# Patient Record
Sex: FEMALE | Race: WHITE
[De-identification: names, ages, dates, MRNs, and addresses within clinical notes are randomized per-mention and may not be internally consistent; named-entity substitution may affect disease eponyms.]

---

## 2017-03-16 ENCOUNTER — HOSPITAL ENCOUNTER (OUTPATIENT)
Dept: HOSPITAL 47 - RADMAMWWP | Age: 62
Discharge: HOME | End: 2017-03-16
Payer: COMMERCIAL

## 2017-03-16 DIAGNOSIS — Z12.31: Primary | ICD-10-CM

## 2017-03-17 NOTE — MM
Reason for exam: screening  (asymptomatic).

Last mammogram was performed 1 year and 1 month ago.



History:

Patient is postmenopausal and is nulliparous.



Physical Findings:

A clinical breast exam by your physician is recommended on an annual basis and 

results should be correlated with mammographic findings.



MG Screening Mammo w CAD

Bilateral CC and MLO view(s) were taken.

Prior study comparison: February 1, 2016, bilateral MG screening mammo w CAD.  

January 30, 2015, bilateral MG screening mammo w CAD.

There are scattered fibroglandular densities.  Asymmetric breast tissue in the 

left breast.

This finding is more defined when compared with previous exams.





ASSESSMENT: Incomplete: need additional imaging evaluation, BI-RAD 0



RECOMMENDATION:

Special view mammogram of the left breast.





If lesion persists on supplemental views, image directed ultrasound is 

recommended.



[facility] will attempt to contact patient to return for supplemental 

views and ultrasound if indicated.

## 2017-03-27 ENCOUNTER — HOSPITAL ENCOUNTER (OUTPATIENT)
Dept: HOSPITAL 47 - RADMAMWWP | Age: 62
Discharge: HOME | End: 2017-03-27
Payer: COMMERCIAL

## 2017-03-27 DIAGNOSIS — R92.8: Primary | ICD-10-CM

## 2017-03-27 NOTE — MM
Reason for exam: additional evaluation requested from abnormal screening.

Last mammogram was performed less than 1 month ago.



History:

Patient is postmenopausal and is nulliparous.



Physical Findings:

Nurse did not find any significant physical abnormalities on exam.



MG Work Up Mamm w CAD LT

CC and MLO view(s) were taken of the left breast.

Prior study comparison: March 16, 2017, bilateral MG screening mammo w CAD.  

February 1, 2016, bilateral MG screening mammo w CAD.

There are scattered fibroglandular densities.  There is no discrete abnormality 

including area of concern.

No significant new findings when compared with previous films.



These results were verbally communicated with the patient and result sheet given 

to the patient on 3/27/17.





ASSESSMENT: Benign, BI-RAD 2



RECOMMENDATION:

Return to routine screening mammogram schedule for both breasts.

## 2017-07-13 ENCOUNTER — HOSPITAL ENCOUNTER (OUTPATIENT)
Dept: HOSPITAL 47 - CATHEP | Age: 62
LOS: 1 days | Discharge: HOME | End: 2017-07-14
Payer: COMMERCIAL

## 2017-07-13 VITALS — BODY MASS INDEX: 39 KG/M2

## 2017-07-13 DIAGNOSIS — Z79.51: ICD-10-CM

## 2017-07-13 DIAGNOSIS — E78.5: ICD-10-CM

## 2017-07-13 DIAGNOSIS — Z79.899: ICD-10-CM

## 2017-07-13 DIAGNOSIS — Z87.891: ICD-10-CM

## 2017-07-13 DIAGNOSIS — I50.9: ICD-10-CM

## 2017-07-13 DIAGNOSIS — I47.1: ICD-10-CM

## 2017-07-13 DIAGNOSIS — Z82.49: ICD-10-CM

## 2017-07-13 DIAGNOSIS — N18.3: ICD-10-CM

## 2017-07-13 DIAGNOSIS — J44.9: ICD-10-CM

## 2017-07-13 DIAGNOSIS — J43.1: ICD-10-CM

## 2017-07-13 DIAGNOSIS — I13.0: Primary | ICD-10-CM

## 2017-07-13 LAB
ANION GAP SERPL CALC-SCNC: 13 MMOL/L
BUN SERPL-SCNC: 62 MG/DL (ref 7–17)
CALCIUM SPEC-MCNC: 9.9 MG/DL (ref 8.4–10.2)
CHLORIDE SERPL-SCNC: 101 MMOL/L (ref 98–107)
CO2 SERPL-SCNC: 24 MMOL/L (ref 22–30)
GLUCOSE SERPL-MCNC: 121 MG/DL (ref 74–99)
NON-AFRICAN AMERICAN GFR(MDRD): 39
POTASSIUM SERPL-SCNC: 4.4 MMOL/L (ref 3.5–5.1)
SODIUM SERPL-SCNC: 138 MMOL/L (ref 137–145)

## 2017-07-13 PROCEDURE — 93613 INTRACARDIAC EPHYS 3D MAPG: CPT

## 2017-07-13 PROCEDURE — 80048 BASIC METABOLIC PNL TOTAL CA: CPT

## 2017-07-13 PROCEDURE — 94760 N-INVAS EAR/PLS OXIMETRY 1: CPT

## 2017-07-13 PROCEDURE — 93653 COMPRE EP EVAL TX SVT: CPT

## 2017-07-13 PROCEDURE — 93623 PRGRMD STIMJ&PACG IV RX NFS: CPT

## 2017-07-13 PROCEDURE — 94640 AIRWAY INHALATION TREATMENT: CPT

## 2017-07-13 RX ADMIN — BUDESONIDE AND FORMOTEROL FUMARATE DIHYDRATE SCH: 80; 4.5 AEROSOL RESPIRATORY (INHALATION) at 20:30

## 2017-07-13 NOTE — MISC
LETTER TO DR. CASANOVA



Re: Lilliana Sifuentes



Dear Narendra, 



I had the pleasure of seeing Lilliana Sifuentes in electrophysiology followup. 
Prior to her diagnostic EP study, I reviewed her labs. You had recently 
increased lisinopril. There was a significant increase in her BUN and creatinine
, and I discontinued spironolactone and lisinopril. Her creatinine has improved
, but her BUN is still elevated. She is on Lasix 40 mg p.o. daily, which I will 
continue for now. 



She underwent a diagnostic EP study which revealed AV bárbara reentry, and she 
underwent successful radiofrequency ablation for this.



She also underwent a right heart catheter in the same procedure, and she has 
moderate pulmonary hypertension with pulmonary artery pressure and right 
ventricular systolic pressures between 50 and 55 mmHg. Her LA and RA pressures 
are between 16 and 18 mmHg. 



At this time, I would hold off on ACE inhibitors and angiotensin receptor 
blockers, but in the future this could be re-attempted but without Lasix if 
possible. 



Thank you for entrusting me with the care of your patient. Warm regards.



Sincerely,







Ki Rangel M.D.
[facility]ADA

## 2017-07-13 NOTE — PCN
This patient is a 62-year-old female with a history of:

1. Shortness of breath on exertion with elevated right-sided pressures on 2-D 
echo/non-invasive imaging.

2. COPD. She has stopped smoking now. 

3. Recurrent supraventricular tachycardia documented, requiring adenosine for 
treatment, symptomatic.



She was brought to the EP lab for right heart cath, which has been treated 
separately. She was noted to have moderate pulmonary hypertension.



Her recent labs about a week back after the dose of lisinopril was increased to 
20 mg daily showed a rise in BUN and creatinine. The creatinine was greater 
than 2.3 and therefore I held lisinopril along with spironolactone. Repeat 
testing today shows that her BUN and creatinine have improved to 62 and 1.38. 
GFR is 47. Glucose is 121. Potassium is normal. She will be following up with 
Dr. Hill regarding her chronic kidney disease, stage III-IV. 



She was brought to the EP lab for a diagnostic EP study and radiofrequency 
ablation of SVT.



The patient was brought to the EP lab in a fasting state. Written informed 
consent was obtained prior to the procedure. The left shoulder area was prepped 
and draped as per protocol. Lidocaine 1% was used for local anesthesia. A 6 
Wolof sheath was placed in the left axillary vein. Via this, a decapolar 
catheter was positioned in the coronary sinus for coronary sinus pacing and 
recording. Venous sheaths were placed in the right femoral vein and via these, 
diagnostic catheters were placed in the right  heart.



The sinus cycle length was 732 ms, W[place] interval 159 ms, QRS 86 ms,  ms.



AH interval 80 ms. HP interval 31 ms. 



Sinus node recovery times at 600, 500 and 400 ms were 1192, 695 and 1051 ms. 
There was no evidence for delta waves, no slow pathway conduction.



AV node Wenckebach block was 20 ms. VA Wenckebach block 330 ms. SVT consistent 
with AV bárbara (      ) was induced by catheter manipulation in the ventricle. 
Tachycardia cycle length was 440 ms. Later this was once again induced with 
ventricular pacing.



The tachycardia cycle length was 440 ms. Onset of the tachycardia was with a 
long AH interval. VA times were less than 60 ms. Entrainment from the RV was 
performed. VAV response was obtained and the post pacing interval minus 
tachycardia cycle length interval was about 200 ms, consistent with AV bárbara 
reentry. 



A 4 mm tip ablation catheter was used. A long sheath was used for stability. 
Two different sheaths were used to maintain stability, since she has enlarged 
right atrium. Other fibrillation was performed outside the coronary sinus os, 
anterior to it. Junctional rhythm was obtained. Other fibrillations had to be 
performed once again after testing, since AV bárbara reentry was once again 
reducible, but thereafter ventricular pacing resulted in VA block at a long 
pacing cycle length of 550 to 600 ms.



Isuprel was used to induce SVT first time, but at the end of the procedure, 
despite pacing from the coronary sinus as well as from the right ventricle with 
extrastimuli as well as burst stimulation, no SVT was induced. All catheters 
were then removed at the end of the procedure and hemostasis was assured. 



IMPRESSION: 

1. Diagnostic EP study revealing AV bárbara reentry mechanism with tachycardia. 

2. Successful 3-D mapping and ablation.



Three-dimensional mapping of the slow pathway was performed. Three-D mapping 
was performed with a 4 mm tip ablation catheter. The His cloud was mapped and 
the coronary sinus was mapped (FAM map). The tricuspid annulus was mapped. Slow 
pathway was mapped and RF ablation was performed of the CS os anterior to it 
with successful result. 



Normal sinus node function. No excessive pathway conduction. Para-Hisian pacing 
revealed a bárbara response. Patient tolerated the procedure well without any 
acute complications.
MTDD

## 2017-07-13 NOTE — P.PCN
Preoperative Diagnosis: 


Patient with a history of lung disease and pulmonary hypertension





Right heart cath was performed for shortness of breath and elevated RVSP on 

noninvasive testing/2-D echo and Doppler





By the right femoral vein, a [place] catheter, balloontipped was introduced 

into the pulmonary artery





Pulmonary artery pressure 52/11/29 mmHg 


pulmonary capillary wedge pressure 18/1/11





RVSP 47/0/19





RA 16/-1/9





Impression


Moderate pulmonary hypertension with mildly increased RA and LA/wedge pressures


Postoperative Diagnosis: 





Procedure(s) Performed: 





Implants: 





Indications for Procedure: 





Operative Findings: 





Description of Procedure:

## 2017-07-14 VITALS
TEMPERATURE: 98.1 F | DIASTOLIC BLOOD PRESSURE: 81 MMHG | HEART RATE: 73 BPM | SYSTOLIC BLOOD PRESSURE: 150 MMHG | RESPIRATION RATE: 16 BRPM

## 2017-07-14 RX ADMIN — BUDESONIDE AND FORMOTEROL FUMARATE DIHYDRATE SCH PUFF: 80; 4.5 AEROSOL RESPIRATORY (INHALATION) at 07:20

## 2017-07-14 NOTE — P.DS
Providers


Attending physician: 


Ki Rangel





Primary care physician: 


Narendra Sergio





[facility] Course: 





Patient is doing well from a cardiac standpoint.  Rhythm is regular IL interval 

is normal she underwent a diagnostic EP study which showed AV bárbara reentry and 

she underwent successful ablation for this.  Tachycardia was rendered 

noninducible.  She also underwent a right heart cath and she has pulmonary 

artery pressures in the range of 50-55 mmHg.  Right atrial pressure 16 mmHg and 

left atrial pressures approximately 18 mmHg.





She was started on lisinopril previously up to 20 mg by mouth daily and there 

was a significant increase in her creatinine.  This was discontinued 1 day 

prior to ablation.  Her creatinine improved but her BNP is still elevated.  I 

have held lisinopril and spironolactone for now but she continues Lasix 40 mg 

by mouth daily.  This morning her blood pressure is mildly elevated and I am 

adding amlodipine 2.5 mg by mouth daily.  She will see Dr. Hill early next 

week for further adjustment of her blood pressure medications if needed as well 

as reassessment of renal function





Groin is healing well no hematoma.  Heart sounds are normal no rub no gallop 

breath sounds are normal no rhonchi no crackles





Impression


SVT status post ablation


Hypertension, essential


COPD, chronic, past history of smoking


Moderate pulmonary hypertension


Dyslipidemia





[place] discharge home today after ablating in the hallways.  I'm discontinuing 

metoprolol, spironolactone and lisinopril.  I'm adding amlodipine 2.5 mg by 

mouth daily and we'll see her in follow-up in about a week and she will keep 

her appointment with a primary care physician


Patient Condition at Discharge: Stable





Plan - Discharge Summary


New Discharge Prescriptions: 


New


   amLODIPine [Norvasc] 2.5 mg PO DAILY #12 tablet





Discontinued


   Spironolactone [Aldactone] 12.5 mg PO DAILY


   Lisinopril [Zestril] 20 mg PO DAILY


   Metoprolol Succinate [Toprol XL] 50 mg PO DAILY





No Action


   Cholecalciferol [Vitamin D3] 2,000 unit PO DAILY


   Tiotropium Bromide [Spiriva] 1 cap INHALATION RT-DAILY


   Fenofibrate [Lofibra] 160 mg PO DAILY


   Albuterol Inhaler [Ventolin Hfa Inhaler] 2 puff INHALATION RT-Q6H PRN


     PRN Reason: Shortness Of Breath


   Mometasone/Formoterol [Dulera 100 Mcg/5 Mcg Inhaler] 2 puff INHALATION RT-BID


   Furosemide [Lasix] 40 mg PO DAILY #40 tablet


   Pravastatin Sodium [Pravachol] 10 mg PO DAILY


Discharge Medication List





Albuterol Inhaler [Ventolin Hfa Inhaler] 2 puff INHALATION RT-Q6H PRN 06/14/16 [

History]


Cholecalciferol [Vitamin D3] 2,000 unit PO DAILY 06/14/16 [History]


Fenofibrate [Lofibra] 160 mg PO DAILY 06/14/16 [History]


Mometasone/Formoterol [Dulera 100 Mcg/5 Mcg Inhaler] 2 puff INHALATION RT-BID 06 /14/16 [History]


Tiotropium Bromide [Spiriva] 1 cap INHALATION RT-DAILY 06/14/16 [History]


Furosemide [Lasix] 40 mg PO DAILY #40 tablet 06/20/16 [Rx]


Pravastatin Sodium [Pravachol] 10 mg PO DAILY 07/10/17 [History]


amLODIPine [Norvasc] 2.5 mg PO DAILY #12 tablet 07/14/17 [Rx]








Follow up Appointment(s)/Referral(s): 


Ki Rangel MD [STAFF PHYSICIAN] - 1 Week


Activity/Diet/Wound Care/Special Instructions: 


Post EP study - Ablation instructions





1.  Keep access sites dry for 2 days.


2.  No heavy lifting or straining for 2 days.


3.  Avoid bending the hips repeatedly for 2 days.


4.  You may go up and down stairs slowly  





Call if the following is noted





1.  Bleeding, increasing swelling or pain at the access sites.


2.  Increasing chest discomfort, especially upon taking a deep breath.


3.  Increasing shortness of breath, at rest or with exertion.


4.  Undue cough / phlegm


5.  Difficulty or pain while swallowing.


6.  Pain or change in color in the extremities.


7.  Fever, chills, rigors.


8.  Increasing headache or neurologic symptoms.


9.  Dizziness, fainting, palpitations





Stop metoprolol


Stop spironolactone


Stop lisinopril





New medication: Amlodipine 2.5 mg by mouth daily





Keep appointment with Dr. Hill


Follow-up with Dr. Ruby in 1 week for a groin check


Discharge Disposition: HOME SELF-CARE

## 2018-02-13 ENCOUNTER — HOSPITAL ENCOUNTER (INPATIENT)
Dept: HOSPITAL 47 - EC | Age: 63
LOS: 9 days | Discharge: NURSING, REHAB OR LONG-TERM CARE | DRG: 853 | End: 2018-02-22
Attending: SURGERY | Admitting: SURGERY
Payer: COMMERCIAL

## 2018-02-13 VITALS — BODY MASS INDEX: 45.9 KG/M2

## 2018-02-13 DIAGNOSIS — D72.829: ICD-10-CM

## 2018-02-13 DIAGNOSIS — E78.1: ICD-10-CM

## 2018-02-13 DIAGNOSIS — M41.9: ICD-10-CM

## 2018-02-13 DIAGNOSIS — Z86.79: ICD-10-CM

## 2018-02-13 DIAGNOSIS — A41.9: Primary | ICD-10-CM

## 2018-02-13 DIAGNOSIS — E87.3: ICD-10-CM

## 2018-02-13 DIAGNOSIS — E66.01: ICD-10-CM

## 2018-02-13 DIAGNOSIS — J98.11: ICD-10-CM

## 2018-02-13 DIAGNOSIS — K76.0: ICD-10-CM

## 2018-02-13 DIAGNOSIS — T38.0X5A: ICD-10-CM

## 2018-02-13 DIAGNOSIS — J96.21: ICD-10-CM

## 2018-02-13 DIAGNOSIS — Z87.891: ICD-10-CM

## 2018-02-13 DIAGNOSIS — K80.12: ICD-10-CM

## 2018-02-13 DIAGNOSIS — Z79.899: ICD-10-CM

## 2018-02-13 DIAGNOSIS — J44.1: ICD-10-CM

## 2018-02-13 DIAGNOSIS — Z79.51: ICD-10-CM

## 2018-02-13 DIAGNOSIS — I11.0: ICD-10-CM

## 2018-02-13 DIAGNOSIS — I50.32: ICD-10-CM

## 2018-02-13 DIAGNOSIS — E78.5: ICD-10-CM

## 2018-02-13 DIAGNOSIS — R18.8: ICD-10-CM

## 2018-02-13 LAB
ALBUMIN SERPL-MCNC: 3.8 G/DL (ref 3.5–5)
ALP SERPL-CCNC: 127 U/L (ref 38–126)
ALT SERPL-CCNC: 155 U/L (ref 9–52)
ANION GAP SERPL CALC-SCNC: 17 MMOL/L
AST SERPL-CCNC: 150 U/L (ref 14–36)
BASOPHILS # BLD AUTO: 0 K/UL (ref 0–0.2)
BASOPHILS NFR BLD AUTO: 0 %
BUN SERPL-SCNC: 26 MG/DL (ref 7–17)
CALCIUM SPEC-MCNC: 9.9 MG/DL (ref 8.4–10.2)
CHLORIDE SERPL-SCNC: 98 MMOL/L (ref 98–107)
CO2 SERPL-SCNC: 26 MMOL/L (ref 22–30)
EOSINOPHIL # BLD AUTO: 0.1 K/UL (ref 0–0.7)
EOSINOPHIL NFR BLD AUTO: 1 %
ERYTHROCYTE [DISTWIDTH] IN BLOOD BY AUTOMATED COUNT: 5.78 M/UL (ref 3.8–5.4)
ERYTHROCYTE [DISTWIDTH] IN BLOOD: 13.8 % (ref 11.5–15.5)
GLUCOSE SERPL-MCNC: 155 MG/DL (ref 74–99)
HCT VFR BLD AUTO: 49.5 % (ref 34–46)
HGB BLD-MCNC: 15.5 GM/DL (ref 11.4–16)
LIPASE SERPL-CCNC: 33 U/L (ref 23–300)
LYMPHOCYTES # SPEC AUTO: 1.2 K/UL (ref 1–4.8)
LYMPHOCYTES NFR SPEC AUTO: 5 %
MCH RBC QN AUTO: 26.8 PG (ref 25–35)
MCHC RBC AUTO-ENTMCNC: 31.3 G/DL (ref 31–37)
MCV RBC AUTO: 85.6 FL (ref 80–100)
MONOCYTES # BLD AUTO: 1.1 K/UL (ref 0–1)
MONOCYTES NFR BLD AUTO: 5 %
NEUTROPHILS # BLD AUTO: 20.2 K/UL (ref 1.3–7.7)
NEUTROPHILS NFR BLD AUTO: 89 %
PLATELET # BLD AUTO: 280 K/UL (ref 150–450)
POTASSIUM SERPL-SCNC: 3.6 MMOL/L (ref 3.5–5.1)
PROT SERPL-MCNC: 6.8 G/DL (ref 6.3–8.2)
SODIUM SERPL-SCNC: 141 MMOL/L (ref 137–145)
WBC # BLD AUTO: 22.8 K/UL (ref 3.8–10.6)

## 2018-02-13 PROCEDURE — 93312 ECHO TRANSESOPHAGEAL: CPT

## 2018-02-13 PROCEDURE — 85025 COMPLETE CBC W/AUTO DIFF WBC: CPT

## 2018-02-13 PROCEDURE — 85027 COMPLETE CBC AUTOMATED: CPT

## 2018-02-13 PROCEDURE — 94660 CPAP INITIATION&MGMT: CPT

## 2018-02-13 PROCEDURE — 80053 COMPREHEN METABOLIC PANEL: CPT

## 2018-02-13 PROCEDURE — 99285 EMERGENCY DEPT VISIT HI MDM: CPT

## 2018-02-13 PROCEDURE — 84132 ASSAY OF SERUM POTASSIUM: CPT

## 2018-02-13 PROCEDURE — 94003 VENT MGMT INPAT SUBQ DAY: CPT

## 2018-02-13 PROCEDURE — 83036 HEMOGLOBIN GLYCOSYLATED A1C: CPT

## 2018-02-13 PROCEDURE — 94002 VENT MGMT INPAT INIT DAY: CPT

## 2018-02-13 PROCEDURE — 93320 DOPPLER ECHO COMPLETE: CPT

## 2018-02-13 PROCEDURE — 36415 COLL VENOUS BLD VENIPUNCTURE: CPT

## 2018-02-13 PROCEDURE — 83690 ASSAY OF LIPASE: CPT

## 2018-02-13 PROCEDURE — 94770: CPT

## 2018-02-13 PROCEDURE — 83735 ASSAY OF MAGNESIUM: CPT

## 2018-02-13 PROCEDURE — 96374 THER/PROPH/DIAG INJ IV PUSH: CPT

## 2018-02-13 PROCEDURE — 88304 TISSUE EXAM BY PATHOLOGIST: CPT

## 2018-02-13 PROCEDURE — 36600 WITHDRAWAL OF ARTERIAL BLOOD: CPT

## 2018-02-13 PROCEDURE — 82805 BLOOD GASES W/O2 SATURATION: CPT

## 2018-02-13 PROCEDURE — 96375 TX/PRO/DX INJ NEW DRUG ADDON: CPT

## 2018-02-13 PROCEDURE — 96376 TX/PRO/DX INJ SAME DRUG ADON: CPT

## 2018-02-13 PROCEDURE — 94640 AIRWAY INHALATION TREATMENT: CPT

## 2018-02-13 PROCEDURE — 71275 CT ANGIOGRAPHY CHEST: CPT

## 2018-02-13 PROCEDURE — 93325 DOPPLER ECHO COLOR FLOW MAPG: CPT

## 2018-02-13 PROCEDURE — 84100 ASSAY OF PHOSPHORUS: CPT

## 2018-02-13 PROCEDURE — 71045 X-RAY EXAM CHEST 1 VIEW: CPT

## 2018-02-13 PROCEDURE — 87502 INFLUENZA DNA AMP PROBE: CPT

## 2018-02-13 PROCEDURE — 94760 N-INVAS EAR/PLS OXIMETRY 1: CPT

## 2018-02-13 PROCEDURE — 80048 BASIC METABOLIC PNL TOTAL CA: CPT

## 2018-02-13 PROCEDURE — 87493 C DIFF AMPLIFIED PROBE: CPT

## 2018-02-13 PROCEDURE — 74177 CT ABD & PELVIS W/CONTRAST: CPT

## 2018-02-13 PROCEDURE — 93306 TTE W/DOPPLER COMPLETE: CPT

## 2018-02-13 RX ADMIN — CEFAZOLIN SCH MLS/HR: 330 INJECTION, POWDER, FOR SOLUTION INTRAMUSCULAR; INTRAVENOUS at 20:26

## 2018-02-13 RX ADMIN — ONDANSETRON STA MG: 2 INJECTION INTRAMUSCULAR; INTRAVENOUS at 16:37

## 2018-02-13 RX ADMIN — MORPHINE SULFATE PRN MG: 4 INJECTION, SOLUTION INTRAMUSCULAR; INTRAVENOUS at 20:30

## 2018-02-13 NOTE — CT
EXAMINATION TYPE: CT abdomen pelvis w con

 

DATE OF EXAM: 2/13/2018

 

COMPARISON: NONE

 

HISTORY: Patient complains of bloating, bilateral upper quad pain, nausea, and vomiting.

 

CT DLP: 1749 mGycm

Automated exposure control for dose reduction was used.

 

TECHNIQUE:  Helical acquisition of images was performed from the lung bases through the pelvis.

 

CONTRAST: 

Performed with Oral Contrast and with IV Contrast, patient injected with 80 mL of Visipaque 320.

 

FINDINGS: 

 

There is some patchy atelectasis at the lung bases and more on the right side. There is no pleural ef
fusion.

 

There is small pericardial effusion. There is fatty infiltration of the liver. Liver shows no focal d
efect. Bile ducts are not dilated. Gallbladder is large and measures 5.8 cm in diameter. There are sm
all multiple calcified gallstones.

 

Spleen appears normal. There is no evidence of a pancreatic mass.

 

There is some fluid around the anterior gallbladder.

 

I see no intestinal wall thickening. There are no dilated loops. There is mild free fluid in the cul-
de-sac. Bladder distends smoothly. There is no sign of a pelvic mass. There is a small amount of flui
d in the right paracolic gutter. There is no sign of free air. There is no sign of a bowel obstructio
n. I see no intestinal wall thickening. I see no bony destructive process.

IMPRESSION: 

FATTY INFILTRATION OF THE LIVER. MILD PATCHY ATELECTASIS AT THE LUNG BASES.

 

DILATED GALLBLADDER WITH PERICHOLECYSTIC FLUID AND MULTIPLE GALLSTONES. THIS IS CONSISTENT WITH ACUTE
 AND CHRONIC CHOLECYSTITIS.

 

MILD ASCITES.

## 2018-02-13 NOTE — ED
Abdominal Pain HPI





- General


Chief Complaint: Abdominal Pain


Stated Complaint: abdominal pain


Time Seen by Provider: 02/13/18 15:35


Source: patient


Mode of arrival: wheelchair


Limitations: no limitations





- History of Present Illness


Initial Comments: 


Patient presents with a chief complaint of abdominal pain.  She was seen by her 

primary care physician earlier today who was concerned and sent her to the 

emergency department.  Patient states this is being ordered on for about a week 

however gradually worse.  Patient identify any inciting incidences.  There are 

no aggravating or alleviating factors.  Timing is constant.  Patient denies any 

previous history of surgeries to the abdomen.  Patient states that she has been 

nauseated and had a couple episodes of emesis.  Her last bowel movement was 

today.








- Related Data


 Home Medications











 Medication  Instructions  Recorded  Confirmed


 


Albuterol Inhaler [Ventolin Hfa 2 puff INHALATION RT-Q6H PRN 06/14/16 02/13/18





Inhaler]   


 


Cholecalciferol [Vitamin D3] 2,000 unit PO DAILY 06/14/16 02/13/18


 


Fenofibrate [Lofibra] 160 mg PO DAILY 06/14/16 02/13/18


 


Mometasone/Formoterol [Dulera 100 2 puff INHALATION RT-BID 06/14/16 02/13/18





Mcg/5 Mcg Inhaler]   


 


Tiotropium Bromide [Spiriva] 1 cap INHALATION RT-DAILY 06/14/16 02/13/18


 


Pravastatin Sodium [Pravachol] 10 mg PO DAILY 07/10/17 02/13/18


 


Albuterol Nebulized [Ventolin 2.5 mg INHALATION RT-Q6H PRN 02/13/18 02/13/18





Nebulized]   


 


amLODIPine [Norvasc] 10 mg PO DAILY 02/13/18 02/13/18








 Previous Rx's











 Medication  Instructions  Recorded


 


Furosemide [Lasix] 40 mg PO DAILY #40 tablet 06/20/16











 Allergies











Allergy/AdvReac Type Severity Reaction Status Date / Time


 


No Known Allergies Allergy   Verified 02/13/18 15:46














Review of Systems


ROS Statement: 


Those systems with pertinent positive or pertinent negative responses have been 

documented in the HPI.





ROS Other: All systems not noted in ROS Statement are negative.


Gastrointestinal: Reports: abdominal pain, nausea, vomiting, diarrhea





Past Medical History


Past Medical History: COPD, Hyperlipidemia, Hypertension


Additional Past Medical History / Comment(s): S-shaped scoliosis of the spine,


History of Any Multi-Drug Resistant Organisms: None Reported


Past Surgical History: No Surgical Hx Reported


Additional Past Surgical History / Comment(s): TOOTH EXTRACTIONS.  COLONOSCOPY


Past Anesthesia/Blood Transfusion Reactions: No Reported Reaction


Past Psychological History: No Psychological Hx Reported


Smoking Status: Former smoker


Past Alcohol Use History: None Reported


Past Drug Use History: None Reported





- Past Family History


  ** Father


Family Medical History: Congestive Heart Failure (CHF), COPD





  ** Mother


Family Medical History: Congestive Heart Failure (CHF), COPD





General Exam


Limitations: no limitations


General appearance: alert, in no apparent distress


Head exam: Present: atraumatic, normocephalic


Eye exam: Present: normal appearance


ENT exam: Present: mucous membranes moist


Respiratory exam: Present: normal lung sounds bilaterally.  Absent: respiratory 

distress


Cardiovascular Exam: Present: regular rate, normal rhythm


GI/Abdominal exam: Present: soft, distended, tenderness


Neurological exam: Present: alert, oriented X3


Psychiatric exam: Present: normal affect, normal mood


Skin exam: Present: warm, dry, intact





Course





 Vital Signs











  02/13/18





  14:43


 


Temperature 100.2 F H


 


Pulse Rate 111 H


 


Respiratory 20





Rate 


 


Blood Pressure 153/75


 


O2 Sat by Pulse 93 L





Oximetry 














Medical Decision Making





- Medical Decision Making


Patient presents with a chief complaint of abdominal pain.  She was sent in by 

her primary care for concerns of distention.  On initial evaluation, patient is 

mildly febrile at 100.2, and mildly tachycardic.  Patient was given 2 L of IV 

fluid, morphine for pain, and Zofran for nausea.  He'll be evaluated with basic 

abdominal labs, and CT evaluation.





7:49 PM


Lab evaluation of this patient shows leukocytosis at just over 22,000.  Liver 

functions are elevated along with an elevated alk phos.  Bilirubins are normal.

  Computed tomography scan of the abdomen and pelvis shows a dilated 

gallbladder with numerous gallstones and pericholecystic fluid.  Given patient'

s presentation, she'll be started on Zosyn for acute cholecystitis.  I spoke 

with Dr. Bobo who excepts admission of this patient.  He agrees with Zosyn

, would like the patient to be clear liquids until midnight and made nothing by 

mouth at midnight.  Repeat CBC and CMP will be ordered for the morning.








- Lab Data


Result diagrams: 


 02/13/18 16:30





 02/13/18 16:30





 Lab Results











  02/13/18 02/13/18 Range/Units





  16:30 16:30 


 


WBC  22.8 H   (3.8-10.6)  k/uL


 


RBC  5.78 H   (3.80-5.40)  m/uL


 


Hgb  15.5   (11.4-16.0)  gm/dL


 


Hct  49.5 H   (34.0-46.0)  %


 


MCV  85.6   (80.0-100.0)  fL


 


MCH  26.8   (25.0-35.0)  pg


 


MCHC  31.3   (31.0-37.0)  g/dL


 


RDW  13.8   (11.5-15.5)  %


 


Plt Count  280   (150-450)  k/uL


 


Neutrophils %  89   %


 


Lymphocytes %  5   %


 


Monocytes %  5   %


 


Eosinophils %  1   %


 


Basophils %  0   %


 


Neutrophils #  20.2 H   (1.3-7.7)  k/uL


 


Lymphocytes #  1.2   (1.0-4.8)  k/uL


 


Monocytes #  1.1 H   (0-1.0)  k/uL


 


Eosinophils #  0.1   (0-0.7)  k/uL


 


Basophils #  0.0   (0-0.2)  k/uL


 


Sodium   141  (137-145)  mmol/L


 


Potassium   3.6  (3.5-5.1)  mmol/L


 


Chloride   98  ()  mmol/L


 


Carbon Dioxide   26  (22-30)  mmol/L


 


Anion Gap   17  mmol/L


 


BUN   26 H  (7-17)  mg/dL


 


Creatinine   1.10 H  (0.52-1.04)  mg/dL


 


Est GFR (MDRD) Af Amer   >60  (>60 ml/min/1.73 sqM)  


 


Est GFR (MDRD) Non-Af   50  (>60 ml/min/1.73 sqM)  


 


Glucose   155 H  (74-99)  mg/dL


 


Calcium   9.9  (8.4-10.2)  mg/dL


 


Total Bilirubin   0.7  (0.2-1.3)  mg/dL


 


AST   150 H  (14-36)  U/L


 


ALT   155 H  (9-52)  U/L


 


Alkaline Phosphatase   127 H  ()  U/L


 


Total Protein   6.8  (6.3-8.2)  g/dL


 


Albumin   3.8  (3.5-5.0)  g/dL


 


Lipase   33  ()  U/L














Disposition


Clinical Impression: 


 Acute cholecystitis, Abdominal pain, Transaminitis





Disposition: ADMITTED AS IP TO THIS hospitals


Condition: Good


Referrals: 


Narendra Hill DO [Primary Care Provider] - 1-2 days


Decision to Admit Reason: Admit from EC





- Out of Hospital Transfer - Req. Specs


Out of Hospital Transfer - Requested Specifics: Other Non-Acute

## 2018-02-14 LAB
ALBUMIN SERPL-MCNC: 3 G/DL (ref 3.5–5)
ALP SERPL-CCNC: 113 U/L (ref 38–126)
ALT SERPL-CCNC: 161 U/L (ref 9–52)
ANION GAP SERPL CALC-SCNC: 9 MMOL/L
AST SERPL-CCNC: 142 U/L (ref 14–36)
BASOPHILS # BLD AUTO: 0 K/UL (ref 0–0.2)
BASOPHILS NFR BLD AUTO: 0 %
BUN SERPL-SCNC: 25 MG/DL (ref 7–17)
CALCIUM SPEC-MCNC: 8.9 MG/DL (ref 8.4–10.2)
CHLORIDE SERPL-SCNC: 99 MMOL/L (ref 98–107)
CO2 BLDA-SCNC: 29 MMOL/L (ref 19–24)
CO2 SERPL-SCNC: 31 MMOL/L (ref 22–30)
EOSINOPHIL # BLD AUTO: 0.1 K/UL (ref 0–0.7)
EOSINOPHIL NFR BLD AUTO: 1 %
ERYTHROCYTE [DISTWIDTH] IN BLOOD BY AUTOMATED COUNT: 4.99 M/UL (ref 3.8–5.4)
ERYTHROCYTE [DISTWIDTH] IN BLOOD: 13.6 % (ref 11.5–15.5)
GLUCOSE BLD-MCNC: 141 MG/DL (ref 75–99)
GLUCOSE SERPL-MCNC: 130 MG/DL (ref 74–99)
HCO3 BLDA-SCNC: 27 MMOL/L (ref 21–25)
HCT VFR BLD AUTO: 43 % (ref 34–46)
HGB BLD-MCNC: 13.2 GM/DL (ref 11.4–16)
LYMPHOCYTES # SPEC AUTO: 1.1 K/UL (ref 1–4.8)
LYMPHOCYTES NFR SPEC AUTO: 7 %
MCH RBC QN AUTO: 26.5 PG (ref 25–35)
MCHC RBC AUTO-ENTMCNC: 30.8 G/DL (ref 31–37)
MCV RBC AUTO: 86.2 FL (ref 80–100)
MONOCYTES # BLD AUTO: 0.8 K/UL (ref 0–1)
MONOCYTES NFR BLD AUTO: 5 %
NEUTROPHILS # BLD AUTO: 14 K/UL (ref 1.3–7.7)
NEUTROPHILS NFR BLD AUTO: 86 %
PCO2 BLDA: 51 MMHG (ref 35–45)
PH BLDA: 7.34 [PH] (ref 7.35–7.45)
PLATELET # BLD AUTO: 232 K/UL (ref 150–450)
PO2 BLDA: 99 MMHG (ref 83–108)
POTASSIUM SERPL-SCNC: 3.5 MMOL/L (ref 3.5–5.1)
PROT SERPL-MCNC: 5.6 G/DL (ref 6.3–8.2)
SODIUM SERPL-SCNC: 139 MMOL/L (ref 137–145)
WBC # BLD AUTO: 16.2 K/UL (ref 3.8–10.6)

## 2018-02-14 PROCEDURE — 0FJ44ZZ INSPECTION OF GALLBLADDER, PERCUTANEOUS ENDOSCOPIC APPROACH: ICD-10-PCS

## 2018-02-14 PROCEDURE — 0D9670Z DRAINAGE OF STOMACH WITH DRAINAGE DEVICE, VIA NATURAL OR ARTIFICIAL OPENING: ICD-10-PCS

## 2018-02-14 PROCEDURE — 0FT40ZZ RESECTION OF GALLBLADDER, OPEN APPROACH: ICD-10-PCS

## 2018-02-14 PROCEDURE — 0BH17EZ INSERTION OF ENDOTRACHEAL AIRWAY INTO TRACHEA, VIA NATURAL OR ARTIFICIAL OPENING: ICD-10-PCS

## 2018-02-14 PROCEDURE — 5A1945Z RESPIRATORY VENTILATION, 24-96 CONSECUTIVE HOURS: ICD-10-PCS

## 2018-02-14 RX ADMIN — ONDANSETRON STA MG: 2 INJECTION INTRAMUSCULAR; INTRAVENOUS at 13:01

## 2018-02-14 RX ADMIN — DOCUSATE SODIUM SCH: 100 CAPSULE, LIQUID FILLED ORAL at 21:39

## 2018-02-14 RX ADMIN — MORPHINE SULFATE PRN MG: 4 INJECTION, SOLUTION INTRAMUSCULAR; INTRAVENOUS at 06:19

## 2018-02-14 RX ADMIN — MEROPENEM SCH MLS/HR: 1 INJECTION, POWDER, FOR SOLUTION INTRAVENOUS at 20:17

## 2018-02-14 RX ADMIN — POTASSIUM CHLORIDE SCH MLS/HR: 14.9 INJECTION, SOLUTION INTRAVENOUS at 20:18

## 2018-02-14 RX ADMIN — MORPHINE SULFATE PRN MG: 4 INJECTION, SOLUTION INTRAMUSCULAR; INTRAVENOUS at 10:07

## 2018-02-14 RX ADMIN — POTASSIUM CHLORIDE SCH MLS: 14.9 INJECTION, SOLUTION INTRAVENOUS at 12:52

## 2018-02-14 RX ADMIN — IPRATROPIUM BROMIDE SCH MG: 0.5 SOLUTION RESPIRATORY (INHALATION) at 07:55

## 2018-02-14 RX ADMIN — BUDESONIDE AND FORMOTEROL FUMARATE DIHYDRATE SCH: 80; 4.5 AEROSOL RESPIRATORY (INHALATION) at 21:35

## 2018-02-14 RX ADMIN — CEFAZOLIN SCH MLS/HR: 330 INJECTION, POWDER, FOR SOLUTION INTRAMUSCULAR; INTRAVENOUS at 03:21

## 2018-02-14 RX ADMIN — IPRATROPIUM BROMIDE SCH MG: 0.5 SOLUTION RESPIRATORY (INHALATION) at 11:50

## 2018-02-14 RX ADMIN — IPRATROPIUM BROMIDE SCH: 0.5 SOLUTION RESPIRATORY (INHALATION) at 16:10

## 2018-02-14 RX ADMIN — BUDESONIDE AND FORMOTEROL FUMARATE DIHYDRATE SCH PUFF: 80; 4.5 AEROSOL RESPIRATORY (INHALATION) at 07:55

## 2018-02-14 RX ADMIN — FUROSEMIDE SCH: 40 TABLET ORAL at 08:54

## 2018-02-14 RX ADMIN — PROPOFOL SCH MLS/HR: 10 INJECTION, EMULSION INTRAVENOUS at 23:26

## 2018-02-14 RX ADMIN — FENOFIBRATE SCH: 160 TABLET ORAL at 08:54

## 2018-02-14 RX ADMIN — HYDROMORPHONE HYDROCHLORIDE PRN MG: 1 INJECTION, SOLUTION INTRAMUSCULAR; INTRAVENOUS; SUBCUTANEOUS at 23:07

## 2018-02-14 RX ADMIN — CHLORHEXIDINE GLUCONATE SCH ML: 1.2 RINSE ORAL at 21:40

## 2018-02-14 RX ADMIN — ISODIUM CHLORIDE PRN MG: 0.03 SOLUTION RESPIRATORY (INHALATION) at 21:17

## 2018-02-14 RX ADMIN — PROPOFOL SCH MLS/HR: 10 INJECTION, EMULSION INTRAVENOUS at 20:50

## 2018-02-14 RX ADMIN — IPRATROPIUM BROMIDE SCH MG: 0.5 SOLUTION RESPIRATORY (INHALATION) at 21:17

## 2018-02-14 RX ADMIN — PRAVASTATIN SODIUM SCH: 20 TABLET ORAL at 08:54

## 2018-02-14 RX ADMIN — Medication SCH: at 20:10

## 2018-02-14 NOTE — CONS
CONSULTATION



DATE OF CONSULTATION:

02/14/2018



REASON FOR CONSULTATION:

Medical management requested by Dr. Trimble.



CONSULTATION:

This is a very pleasant 63-year-old patient of Dr. Hill.  Chronic stable medical

conditions include COPD, hypertension, hyperlipidemia, scoliosis.  For 3 days, patient

started off with increasing abdominal pain, more so in the right upper quadrant.  First

day, did have nausea and vomiting, had couple of loose stools yesterday, no obvious

fever.  The pain continued to increase and presented to the ER.  CT scan did show

gallstones with pericholecystic fluid admitted for gallstones and acute cholecystitis.

The patient is feeling tired, run down, is n.p.o.



REVIEW OF SYSTEMS:

CONSTITUTIONAL: Weak, tired.

HEENT: None.

RESPIRATORY: Baseline some shortness of breath.

CARDIOVASCULAR:  None.

GASTROINTESTINAL: As above.

GENITOURINARY: None.

MUSCULOSKELETAL: None.

DERMATOLOGICAL: None.

HEMATOLOGICAL:  None.

LYMPHATIC: None.

PSYCHIATRY: None.

NEUROLOGICAL: None.



PAST HISTORY:

Past history of COPD, hypertension, hyperlipidemia, scoliosis.



PAST SURGICAL HISTORY:

Cardiac ablation in 2017.



SOCIAL HISTORY:

Lives alone.  Works in the factory.  The patient smoked for close to 40 years, stopped

in 2016.  No alcohol.



FAMILY HISTORY:

Congestive heart failure and COPD.



HOME MEDICATIONS:

1. Ventolin 2.5 q.6 p.r.n.

2. Spiriva 1 capsule daily.

3. Pravachol 10 mg p.o. daily.

4. Dulera 100/5 two puffs b.i.d.

5. Lasix 40 mg p.o. daily.

6. Lofibra 160 mg p.o. daily.

7. Vitamin D3, 2000 units p.o. daily.

8. Norvasc 10 mg p.o. daily.

9. Ventolin HFA 2 puffs q.6 p.r.n.



ALLERGIES:

None.



PHYSICAL EXAMINATION:

On examination, temperature 100.2, pulse 108, respiration 20, blood pressure 153/75,

pulse ox 93% on room.

GENERAL APPEARANCE: Well built, BMI 40.7, lying in bed, tired appearing.

EYES: Pupils equal. Conjunctivae normal.

HEENT:  Oral cavity normal.

NECK:  JVD not raised. Mass not palpable.

RESPIRATORY: Effort increased.

LUNGS:  Diminished breath sounds.

CARDIOVASCULAR: First and second sounds normal. No edema.

ABDOMEN: Distended, right upper quadrant tenderness.  No guarding or rigidity.  Liver

and spleen not palpable.

LYMPHATIC: No lymph node palpable in the neck or axillae.

PSYCHIATRY:  Alert and oriented x3.  Mood and affect normal.

NEUROLOGICAL: Pupils equal.  Cranial nerves grossly intact. Power and sensation grossly

intact.



INVESTIGATIONS:

White count 22.8, hemoglobin 15.5. Potassium 3.6. BUN 26, creatinine 1.10. , ALT

155, bilirubin 0.7.  CT scan of the abdomen shows fatty infiltration of the liver.

Bile ducts are not dilated.  Gallbladder is large and measures 5.8 cm.  There is

pericholecystic fluid.



ASSESSMENT:

1. Acute on chronic cholecystitis with multiple gallstones causing sepsis, present on

    admission.

2. Hepatic steatosis.

3. Morbid obesity, body mass index 40.7.

4. Chronic obstructive pulmonary disease in an ex-smoker.

5. Hyperlipidemia.

6. Hypertension.

7. Scoliosis.



PLAN:

Will increase patient's IV fluids to 125 mL an hour.  The patient is on IV Zosyn.  Home

medications are resumed.  The patient is n.p.o. and going down to the OR.  Care was

discussed with the patient. _____ also breathing treatments.



Thank you Dr. Trimble.





MMANN-MARIEL / IJN: [id number] / Job#: [id number]

## 2018-02-14 NOTE — XR
EXAMINATION TYPE: XR chest 1V confirm line [facility]

 

DATE OF EXAM: 2/14/2018

 

COMPARISON: NONE

 

HISTORY: Check tube placement

 

TECHNIQUE: Single frontal view of the chest is obtained.

 

FINDINGS:  There is a nasogastric tube that has its tip well below the diaphragm. There is endotrache
al tube that appears in good position. There is pulmonary vascular congestion. There is blunting of c
ostophrenic angles. There is infiltrate and atelectasis at the lung bases.

 

IMPRESSION:  Endotracheal tube is in good position. Nasogastric tube appears to be in the gastric ant
rum. There is probably congestive heart failure. There is increasing infiltrate and atelectasis at th
e lung bases compared to exam 5 hours ago.

## 2018-02-14 NOTE — P.OP
Date of Procedure: 02/14/18


Preoperative Diagnosis: 


Acute cholecystitis


Postoperative Diagnosis: 


Acute gangrenous cholecystitis





Cholelithiasis


Procedure(s) Performed: 


Laparoscopic cholecystectomy


Anesthesia: FRANCY


Surgeon: Neil Trimble


Estimated Blood Loss (ml): 100


Pathology: other (Gallbladder, gallstones)


Condition: stable


Disposition: PACU


Description of Procedure: 


The patient's placed on the operating table in the supine position.  She 

received general anesthesia.  The patient is morbidly obese.  Her BMI is 41.  

She had a very protuberant abdomen.  Her abdomen was prepped and draped in 

usual sterile fashion.  The skin incision sites were anesthetized 1% local 

Xylocaine.  Using 11 blade the skin was incised at the umbilicus.  Next using a 

[place] clamp the umbilicus was grasped and a Veress needle was placed into the 

peritoneal cavity.  Position of the Veress needle was confirmed with positive 

drop test.  After adequate insufflation a 5 mm trocar was placed into the 

peritoneal cavity.  The abdomen was insufflated and then the laparoscope was 

placed back the pleural cavity.  The gallbladder was visualized.  There 

appeared to be patchy necrosis of the gallbladder.  At this point a 10 mL 

trocar is placed in the epigastric position and  two 5 mm trochars placed in 

the right lateral and right mid abdomen position.  The patient's placed in the 

right side up reverse Trendelenburg position.  The omentum was peeled off the 

gallbladder and there appeared to be more necrosis of the gallbladder.  The 

gallbladder wall was so thickened that he could not be grasped with the toothed 

grasper.  At this point using Harmonic scissors and opening was made in the 

fundus of the gallbladder and the gallbladder was aspirated.  The grasper was 

attempted to grasp the gallbladder anterior abdominal wall however was very 

friable and the gallbladder wall tore.  Several attempts were made to grasp the 

gallbladder wall however the gallbladder wall was like tissue paper due to the 

necrosis.  The gallbladder was then positioned and the cephalad position by 

pushing on the gallbladder bed.  And then another grasper was placed on the 

neck of the gallbladder.  There was significant inflammation in the hilum area 

the cystic duct could not be visualized.  Due to the patient's morbid obesity 

it was decided that a open cholecystectomy very difficult.  It was decided to 

perform a subtotal cystectomy by dividing the gallbladder through the neck of 

the gallbladder to avoid the duct structures.  At this point using a [place] 

dissector the back wall the gallbladder was dissected off of the liver.  And 

then a 2-0 Ethibond suture was placed behind the gallbladder and then this was 

secured with a tie knot device.  The suture was secured at the neck of the 

gallbladder away from the cystic duct and common bile duct.  Using the Harmonic 

scissors the gallbladder wall was then transected distally to the ligated 

gallbladder neck.  The back wall the gallbladder was necrotic as well.  Using 

traction and the Harmonic scissors the gallbladder was then dissected off of 

the liver bed.  The gallbladder having open and there were stones in the neck 

of the gallbladder and these were removed using the stone forcep.  The specimen 

was placed in a Endo Catch bag and brought out through the 10 mm trocar site.  

This point the abdomen was irrigated.  There is no bile seen.  There is no 

evidence of any biliary duct injury.  Once again there was severe inflammation 

of the jemima hepatis.  At this point a AIDAN drain is placed through the 

epigastric port site and brought out through the 5 mm trocar site.  The AIDAN 

drain is placed in the bed of the gallbladder.  The abdomen was irrigated.  

There is no being seen.  The trochars withdrawn.  The skin was closed 

interrupted 3-0 Monocryl suture.  Dermabond was applied.  Patient top she will 

well and was sent to recovery in stable condition.

## 2018-02-14 NOTE — P.GSHP
History of Present Illness


H&P Date: 02/13/18


Chief Complaint: Right upper quadrant pain





This a 63-year-old female who's had complaints of right upper quadrant pain.  

Patient states she noticed pain on Sunday night.  Patient was seen in emergency 

room found have gallstones.  She is admitted to the hospital for treatment of 

cholecystitis.  Patient awake CAT scan which shows evidence of cholelithiasis 

and pericholecystic fluid.





Past Medical History


Past Medical History: COPD, Hyperlipidemia, Hypertension


Additional Past Medical History / Comment(s): S-shaped scoliosis of the spine,


History of Any Multi-Drug Resistant Organisms: None Reported


Past Surgical History: No Surgical Hx Reported, Cardiac Ablation


Additional Past Surgical History / Comment(s): TOOTH EXTRACTIONS.  COLONOSCOPY 

cardiac ablation july 2017


Past Anesthesia/Blood Transfusion Reactions: No Reported Reaction


Past Psychological History: No Psychological Hx Reported


Additional Psychological History / Comment(s): PT LIVES ALONE, WORKS IN A 

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


Smoking Status: Former smoker


Past Alcohol Use History: None Reported


Additional Past Alcohol Use History / Comment(s): 6/2016-QUIT SMOKING


Past Drug Use History: None Reported





- Past Family History


  ** Father


Family Medical History: Congestive Heart Failure (CHF), COPD





  ** Mother


Family Medical History: Congestive Heart Failure (CHF), COPD





Medications and Allergies


 Home Medications











 Medication  Instructions  Recorded  Confirmed  Type


 


Albuterol Inhaler [Ventolin Hfa 2 puff INHALATION RT-Q6H PRN 06/14/16 02/13/18 

History





Inhaler]    


 


Cholecalciferol [Vitamin D3] 2,000 unit PO DAILY 06/14/16 02/13/18 History


 


Fenofibrate [Lofibra] 160 mg PO DAILY 06/14/16 02/13/18 History


 


Mometasone/Formoterol [Dulera 100 2 puff INHALATION RT-BID 06/14/16 02/13/18 

History





Mcg/5 Mcg Inhaler]    


 


Tiotropium Bromide [Spiriva] 1 cap INHALATION RT-DAILY 06/14/16 02/13/18 History


 


Furosemide [Lasix] 40 mg PO DAILY #40 tablet 06/20/16 02/13/18 Rx


 


Pravastatin Sodium [Pravachol] 10 mg PO DAILY 07/10/17 02/13/18 History


 


Albuterol Nebulized [Ventolin 2.5 mg INHALATION RT-Q6H PRN 02/13/18 02/13/18 

History





Nebulized]    


 


amLODIPine [Norvasc] 10 mg PO DAILY 02/13/18 02/13/18 History











 Allergies











Allergy/AdvReac Type Severity Reaction Status Date / Time


 


No Known Allergies Allergy   Verified 02/13/18 15:46














Surgical - Exam


 Vital Signs











Temp Pulse Resp BP Pulse Ox


 


 100.2 F H  111 H  20   153/75   93 L


 


 02/13/18 14:43  02/13/18 14:43  02/13/18 14:43  02/13/18 14:43  02/13/18 14:43














Patient is morbidly obese.  Her BMI is 41.





- General


well developed, no distress





- Eyes


PERRL





- ENT


normal pinna





- Neck


no masses





- Respiratory


normal expansion





- Cardiovascular


Rhythm: regular





- Abdomen





Right upper quadrant tenderness.  There is no rebound or guarding.


Abdomen: soft





Results





- Labs





 02/14/18 06:13





 02/14/18 06:13


 Abnormal Lab Results - Last 24 Hours (Table)











  02/13/18 02/13/18 02/14/18 Range/Units





  16:30 16:30 06:13 


 


WBC  22.8 H   16.2 H  (3.8-10.6)  k/uL


 


RBC  5.78 H    (3.80-5.40)  m/uL


 


Hct  49.5 H    (34.0-46.0)  %


 


MCHC    30.8 L  (31.0-37.0)  g/dL


 


Neutrophils #  20.2 H   14.0 H  (1.3-7.7)  k/uL


 


Monocytes #  1.1 H    (0-1.0)  k/uL


 


Carbon Dioxide     (22-30)  mmol/L


 


BUN   26 H   (7-17)  mg/dL


 


Creatinine   1.10 H   (0.52-1.04)  mg/dL


 


Glucose   155 H   (74-99)  mg/dL


 


AST   150 H   (14-36)  U/L


 


ALT   155 H   (9-52)  U/L


 


Alkaline Phosphatase   127 H   ()  U/L


 


Total Protein     (6.3-8.2)  g/dL


 


Albumin     (3.5-5.0)  g/dL














  02/14/18 Range/Units





  06:13 


 


WBC   (3.8-10.6)  k/uL


 


RBC   (3.80-5.40)  m/uL


 


Hct   (34.0-46.0)  %


 


MCHC   (31.0-37.0)  g/dL


 


Neutrophils #   (1.3-7.7)  k/uL


 


Monocytes #   (0-1.0)  k/uL


 


Carbon Dioxide  31 H  (22-30)  mmol/L


 


BUN  25 H  (7-17)  mg/dL


 


Creatinine  1.06 H  (0.52-1.04)  mg/dL


 


Glucose  130 H  (74-99)  mg/dL


 


AST  142 H  (14-36)  U/L


 


ALT  161 H  (9-52)  U/L


 


Alkaline Phosphatase   ()  U/L


 


Total Protein  5.6 L  (6.3-8.2)  g/dL


 


Albumin  3.0 L  (3.5-5.0)  g/dL








 Diabetes panel











  02/13/18 02/14/18 Range/Units





  16:30 06:13 


 


Sodium  141  139  (137-145)  mmol/L


 


Potassium  3.6  3.5  (3.5-5.1)  mmol/L


 


Chloride  98  99  ()  mmol/L


 


Carbon Dioxide  26  31 H  (22-30)  mmol/L


 


BUN  26 H  25 H  (7-17)  mg/dL


 


Creatinine  1.10 H  1.06 H  (0.52-1.04)  mg/dL


 


Glucose  155 H  130 H  (74-99)  mg/dL


 


Calcium  9.9  8.9  (8.4-10.2)  mg/dL


 


AST  150 H  142 H  (14-36)  U/L


 


ALT  155 H  161 H  (9-52)  U/L


 


Alkaline Phosphatase  127 H  113  ()  U/L


 


Total Protein  6.8  5.6 L  (6.3-8.2)  g/dL


 


Albumin  3.8  3.0 L  (3.5-5.0)  g/dL








 Calcium panel











  02/13/18 02/14/18 Range/Units





  16:30 06:13 


 


Calcium  9.9  8.9  (8.4-10.2)  mg/dL


 


Albumin  3.8  3.0 L  (3.5-5.0)  g/dL








 Pituitary panel











  02/13/18 02/14/18 Range/Units





  16:30 06:13 


 


Sodium  141  139  (137-145)  mmol/L


 


Potassium  3.6  3.5  (3.5-5.1)  mmol/L


 


Chloride  98  99  ()  mmol/L


 


Carbon Dioxide  26  31 H  (22-30)  mmol/L


 


BUN  26 H  25 H  (7-17)  mg/dL


 


Creatinine  1.10 H  1.06 H  (0.52-1.04)  mg/dL


 


Glucose  155 H  130 H  (74-99)  mg/dL


 


Calcium  9.9  8.9  (8.4-10.2)  mg/dL








 Adrenal panel











  02/13/18 02/14/18 Range/Units





  16:30 06:13 


 


Sodium  141  139  (137-145)  mmol/L


 


Potassium  3.6  3.5  (3.5-5.1)  mmol/L


 


Chloride  98  99  ()  mmol/L


 


Carbon Dioxide  26  31 H  (22-30)  mmol/L


 


BUN  26 H  25 H  (7-17)  mg/dL


 


Creatinine  1.10 H  1.06 H  (0.52-1.04)  mg/dL


 


Glucose  155 H  130 H  (74-99)  mg/dL


 


Calcium  9.9  8.9  (8.4-10.2)  mg/dL


 


Total Bilirubin  0.7  0.5  (0.2-1.3)  mg/dL


 


AST  150 H  142 H  (14-36)  U/L


 


ALT  155 H  161 H  (9-52)  U/L


 


Alkaline Phosphatase  127 H  113  ()  U/L


 


Total Protein  6.8  5.6 L  (6.3-8.2)  g/dL


 


Albumin  3.8  3.0 L  (3.5-5.0)  g/dL














- Imaging


CT scan - abdomen: report reviewed (Cholelithiasis, para cholecystitis fluid)





Assessment and Plan


Assessment: 





Acute cholecystitis.  Patient will undergo laparoscopic cholecystectomy.

## 2018-02-14 NOTE — XR
EXAMINATION TYPE: XR chest 1V confirm line [facility]

 

DATE OF EXAM: 2/14/2018

 

COMPARISON: 8/23/2016

 

HISTORY: Check tube placement

 

TECHNIQUE: Single frontal view of the chest is obtained.

 

FINDINGS:  Endotracheal tube appears in good position. There is some mild infiltrate and atelectasis 
at the lung bases. There is no gross heart failure. I see no pneumothorax.

 

IMPRESSION:  Endotracheal tube is in good position. This is approximate 4.5 cm from the [facility]. There
 are new lower lobe pulmonary infiltrates and atelectasis compared to old exam.

## 2018-02-15 LAB
ALBUMIN SERPL-MCNC: 2.7 G/DL (ref 3.5–5)
ALP SERPL-CCNC: 130 U/L (ref 38–126)
ALT SERPL-CCNC: 162 U/L (ref 9–52)
ANION GAP SERPL CALC-SCNC: 10 MMOL/L
AST SERPL-CCNC: 160 U/L (ref 14–36)
BASOPHILS # BLD AUTO: 0.1 K/UL (ref 0–0.2)
BASOPHILS NFR BLD AUTO: 0 %
BUN SERPL-SCNC: 21 MG/DL (ref 7–17)
CALCIUM SPEC-MCNC: 8.5 MG/DL (ref 8.4–10.2)
CHLORIDE SERPL-SCNC: 99 MMOL/L (ref 98–107)
CO2 BLDA-SCNC: 31 MMOL/L (ref 19–24)
CO2 SERPL-SCNC: 29 MMOL/L (ref 22–30)
EOSINOPHIL # BLD AUTO: 0.2 K/UL (ref 0–0.7)
EOSINOPHIL NFR BLD AUTO: 1 %
ERYTHROCYTE [DISTWIDTH] IN BLOOD BY AUTOMATED COUNT: 4.55 M/UL (ref 3.8–5.4)
ERYTHROCYTE [DISTWIDTH] IN BLOOD: 13.7 % (ref 11.5–15.5)
GLUCOSE BLD-MCNC: 160 MG/DL (ref 75–99)
GLUCOSE BLD-MCNC: 184 MG/DL (ref 75–99)
GLUCOSE SERPL-MCNC: 117 MG/DL (ref 74–99)
HCO3 BLDA-SCNC: 29 MMOL/L (ref 21–25)
HCO3 BLDA-SCNC: 30 MMOL/L (ref 21–25)
HCO3 BLDA-SCNC: 30 MMOL/L (ref 21–25)
HCT VFR BLD AUTO: 39.6 % (ref 34–46)
HGB BLD-MCNC: 12.3 GM/DL (ref 11.4–16)
LYMPHOCYTES # SPEC AUTO: 1.1 K/UL (ref 1–4.8)
LYMPHOCYTES NFR SPEC AUTO: 10 %
MAGNESIUM SPEC-SCNC: 2 MG/DL (ref 1.6–2.3)
MCH RBC QN AUTO: 27 PG (ref 25–35)
MCHC RBC AUTO-ENTMCNC: 31 G/DL (ref 31–37)
MCV RBC AUTO: 87.2 FL (ref 80–100)
MONOCYTES # BLD AUTO: 0.7 K/UL (ref 0–1)
MONOCYTES NFR BLD AUTO: 6 %
NEUTROPHILS # BLD AUTO: 8.7 K/UL (ref 1.3–7.7)
NEUTROPHILS NFR BLD AUTO: 81 %
PCO2 BLDA: 46 MMHG (ref 35–45)
PCO2 BLDA: 49 MMHG (ref 35–45)
PCO2 BLDA: 50 MMHG (ref 35–45)
PH BLDA: 7.38 [PH] (ref 7.35–7.45)
PH BLDA: 7.39 [PH] (ref 7.35–7.45)
PH BLDA: 7.43 [PH] (ref 7.35–7.45)
PLATELET # BLD AUTO: 239 K/UL (ref 150–450)
PO2 BLDA: 67 MMHG (ref 83–108)
PO2 BLDA: 77 MMHG (ref 83–108)
PO2 BLDA: 83 MMHG (ref 83–108)
POTASSIUM SERPL-SCNC: 3.5 MMOL/L (ref 3.5–5.1)
POTASSIUM SERPL-SCNC: 3.9 MMOL/L (ref 3.5–5.1)
PROT SERPL-MCNC: 5.2 G/DL (ref 6.3–8.2)
SODIUM SERPL-SCNC: 138 MMOL/L (ref 137–145)
WBC # BLD AUTO: 10.8 K/UL (ref 3.8–10.6)

## 2018-02-15 RX ADMIN — SODIUM CHLORIDE SCH MLS/HR: 9 INJECTION, SOLUTION INTRAVENOUS at 18:07

## 2018-02-15 RX ADMIN — IPRATROPIUM BROMIDE SCH MG: 0.5 SOLUTION RESPIRATORY (INHALATION) at 16:43

## 2018-02-15 RX ADMIN — PROPOFOL SCH MLS/HR: 10 INJECTION, EMULSION INTRAVENOUS at 13:31

## 2018-02-15 RX ADMIN — INSULIN ASPART SCH UNIT: 100 INJECTION, SOLUTION INTRAVENOUS; SUBCUTANEOUS at 21:29

## 2018-02-15 RX ADMIN — FUROSEMIDE SCH MG: 40 TABLET ORAL at 09:35

## 2018-02-15 RX ADMIN — HYDROMORPHONE HYDROCHLORIDE PRN MG: 1 INJECTION, SOLUTION INTRAMUSCULAR; INTRAVENOUS; SUBCUTANEOUS at 18:32

## 2018-02-15 RX ADMIN — POTASSIUM CHLORIDE SCH MLS/HR: 14.9 INJECTION, SOLUTION INTRAVENOUS at 21:22

## 2018-02-15 RX ADMIN — POTASSIUM CHLORIDE SCH: 14.9 INJECTION, SOLUTION INTRAVENOUS at 11:48

## 2018-02-15 RX ADMIN — CHLORHEXIDINE GLUCONATE SCH ML: 1.2 RINSE ORAL at 09:32

## 2018-02-15 RX ADMIN — BUDESONIDE AND FORMOTEROL FUMARATE DIHYDRATE SCH: 80; 4.5 AEROSOL RESPIRATORY (INHALATION) at 20:06

## 2018-02-15 RX ADMIN — DOCUSATE SODIUM SCH MG: 100 CAPSULE, LIQUID FILLED ORAL at 21:31

## 2018-02-15 RX ADMIN — IPRATROPIUM BROMIDE SCH MG: 0.5 SOLUTION RESPIRATORY (INHALATION) at 11:28

## 2018-02-15 RX ADMIN — POTASSIUM CHLORIDE SCH MLS/HR: 7.46 INJECTION, SOLUTION INTRAVENOUS at 07:44

## 2018-02-15 RX ADMIN — FENOFIBRATE SCH MG: 160 TABLET ORAL at 09:35

## 2018-02-15 RX ADMIN — POTASSIUM CHLORIDE SCH MLS/HR: 7.46 INJECTION, SOLUTION INTRAVENOUS at 05:37

## 2018-02-15 RX ADMIN — CHLORHEXIDINE GLUCONATE SCH ML: 1.2 RINSE ORAL at 21:31

## 2018-02-15 RX ADMIN — POTASSIUM CHLORIDE SCH: 14.9 INJECTION, SOLUTION INTRAVENOUS at 19:06

## 2018-02-15 RX ADMIN — ENOXAPARIN SODIUM SCH MG: 40 INJECTION SUBCUTANEOUS at 09:33

## 2018-02-15 RX ADMIN — PROPOFOL SCH MLS/HR: 10 INJECTION, EMULSION INTRAVENOUS at 01:52

## 2018-02-15 RX ADMIN — PROPOFOL SCH MLS/HR: 10 INJECTION, EMULSION INTRAVENOUS at 21:24

## 2018-02-15 RX ADMIN — POTASSIUM CHLORIDE SCH MLS/HR: 14.9 INJECTION, SOLUTION INTRAVENOUS at 05:37

## 2018-02-15 RX ADMIN — METHYLPREDNISOLONE SODIUM SUCCINATE SCH MG: 40 INJECTION, POWDER, FOR SOLUTION INTRAMUSCULAR; INTRAVENOUS at 18:03

## 2018-02-15 RX ADMIN — SODIUM CHLORIDE SCH MLS/HR: 9 INJECTION, SOLUTION INTRAVENOUS at 19:00

## 2018-02-15 RX ADMIN — MEROPENEM SCH MLS/HR: 1 INJECTION, POWDER, FOR SOLUTION INTRAVENOUS at 16:41

## 2018-02-15 RX ADMIN — METHYLPREDNISOLONE SODIUM SUCCINATE SCH MG: 40 INJECTION, POWDER, FOR SOLUTION INTRAMUSCULAR; INTRAVENOUS at 11:51

## 2018-02-15 RX ADMIN — IPRATROPIUM BROMIDE SCH MG: 0.5 SOLUTION RESPIRATORY (INHALATION) at 20:02

## 2018-02-15 RX ADMIN — Medication SCH: at 11:48

## 2018-02-15 RX ADMIN — HYDROMORPHONE HYDROCHLORIDE PRN MG: 1 INJECTION, SOLUTION INTRAMUSCULAR; INTRAVENOUS; SUBCUTANEOUS at 03:09

## 2018-02-15 RX ADMIN — DOCUSATE SODIUM SCH: 100 CAPSULE, LIQUID FILLED ORAL at 09:32

## 2018-02-15 RX ADMIN — IPRATROPIUM BROMIDE SCH MG: 0.5 SOLUTION RESPIRATORY (INHALATION) at 07:47

## 2018-02-15 RX ADMIN — MEROPENEM SCH MLS/HR: 1 INJECTION, POWDER, FOR SOLUTION INTRAVENOUS at 09:31

## 2018-02-15 RX ADMIN — PRAVASTATIN SODIUM SCH MG: 20 TABLET ORAL at 09:35

## 2018-02-15 RX ADMIN — BUDESONIDE AND FORMOTEROL FUMARATE DIHYDRATE SCH: 80; 4.5 AEROSOL RESPIRATORY (INHALATION) at 07:50

## 2018-02-15 RX ADMIN — PROPOFOL SCH MLS/HR: 10 INJECTION, EMULSION INTRAVENOUS at 05:17

## 2018-02-15 RX ADMIN — MEROPENEM SCH MLS/HR: 1 INJECTION, POWDER, FOR SOLUTION INTRAVENOUS at 01:52

## 2018-02-15 RX ADMIN — PANTOPRAZOLE SODIUM SCH MG: 40 INJECTION, POWDER, FOR SOLUTION INTRAVENOUS at 09:36

## 2018-02-15 NOTE — CT
EXAMINATION TYPE: CT angio chest

 

DATE OF EXAM: 2/15/2018

 

COMPARISON: CT abdomen pelvis 2/13/2018

 

HISTORY: R/O PE, low oxygen levels.

 

CT DLP: 621.9 mGycm

Automated exposure control for dose reduction was used.

 

CONTRAST: 

CTA scan of the thorax is performed with IV Contrast, patient injected with 77 mL of Omnipaque 350, p
ulmonary embolism protocol.  MIP images are created and reviewed.  3D reconstructed images are create
d on an independent workstation and reviewed.

 

FINDINGS: Endotracheal tube is within the tracheal air column in appropriate position, NG tube is pre
sent with the distal tip within the stomach.

 

LUNGS: The lungs are remarkable for airspace disease in the right greater than left lower lobe, there
 are air bronchograms, no sizable effusion, there is no concerning parenchymal mass or nodule identif
ied.   There is no pneumothorax seen.  The tracheobronchial tree is patent.

 

AORTA:  No additional significant abnormality is seen. Pulmonary artery is dilated.

 

MEDIASTINUM: There is satisfactory enhancement of the pulmonary artery and its branches, there is no 
CT evidence for pulmonary embolism. There are artifacts present due to patient's inability to coopera
te with the exam There are no greater than 1 cm hilar or mediastinal lymph nodes.   No pericardial ef
fusion is seen. 

 

 

OTHER:  Scoliotic curvature is present in the visualized spine, there are degenerative disc changes.

 

Postop changes noted in the right upper quadrant, suspect there is a drainage catheter in place, some
 distortion of the bowel is incompletely evaluated, hyperdense material is present likely due to from
 prior contrast administration, CT.

 

IMPRESSION: 

CORRELATE FOR BILATERAL LOWER LOBE PNEUMONIA. THERE ARE ARTIFACTS WHICH OBSCURE SMALL SUBSEGMENTAL BR
ANCHES OF THE LOWER LOBE PULMONARY ARTERIES HOWEVER NO EVIDENT PULMONARY EMBOLISM. CORRELATE FOR PULM
ONARY ARTERY HYPERTENSION.

## 2018-02-15 NOTE — XR
EXAMINATION TYPE: XR chest 1V

 

DATE OF EXAM: 2/15/2018

 

COMPARISON: 2/14/2018

 

HISTORY: Tube placement

 

TECHNIQUE: Single frontal view of the chest is obtained.

 

FINDINGS:  ET and NG tube noted. Findings are stable. Bilateral infiltrate and small effusion. No pne
umothorax or overt failure.

 

IMPRESSION:  Bilateral infiltrate and small effusion stable.

## 2018-02-15 NOTE — P.CON
Consult Note





- .


Consult date: 02/15/18


Assessment/Plan:: 





This is a 63-year-old  female that presented to [facility] emergency center on February 13 with abdominal pain and been going on 

for 1 week and gradually worsening along with nausea, a couple episodes of 

vomiting and abdominal distention.  She went to her primary care physician and 

was sent into the emergency center.  Patient was given morphine, Zofran and 2 L 

of fluid.  She underwent a CAT scan of the abdomen and pelvis with contrast 

that showed a fatty infiltration of the liver.  Acute and chronic cholecystitis

, gallbladder calculi in the gallbladder neck probable impacted stone in the 

gallbladder neck.  Patchy atelectasis in the lung bases more on the right and 

mild ascites.  On February 14, patient went for laparoscopic cholecystectomy 

with Dr. Trimble finding a gangrenous gallbladder.  Following the procedure, 

patient was extubated and then re-intubated and then admitted into intensive 

care unit consult was placed with Dr. Hutton who is following for intensive 

care management.  Patient presented with a temperature 100.2, white count of 

22.8 which is improved to 10.8.  Creatinine is 1.1.  Liver function tests of 

been elevated currently with AST of 160, , alkaline phosphatase 1:30, 

albumin 2.7.  Patient has not required vasopressors.  Her urine output has been 

adequate.  Repeat chest x-ray this morning shows probable heart failure with 

increased infiltrate or atelectasis in the lung bases.  Patient has been 

treated with IV antibiotics the form of Zosyn and Ancef and  was changed to 

meropenem by ID.  Patient remains intubated at this time and appears 

comfortable on mechanical ventilation.  Please see the consult note is dictated 

by nurse practitioner Mrs. Rut Ballesteros.


This 63-year-old woman who suffers superobesity remains intubated and sedated 

with her respiratory failure after her emergent cholecystectomy for gangrenous 

cholecystitis.  Pulmonary critical care is working at attempts for extubation.  

Antimicrobial therapy was altered to meropenem to enhance deep penetration into 

the biliary tract given her sepsis.  Cultures will be monitored.  Leukocytosis 

recommended her sepsis from the gangrenous cholecystitis and expect 

improvement.  Continue supportive care and is receiving nutritional support 

also.  The family was presents questions are answered.  I agree with evaluation

, assessment and plan as dictated by nurse practitioner Mrs. Rut Ballesteros.

## 2018-02-15 NOTE — P.PN
Progress Note - Text


Progress Note Date: 02/15/18


DATE OF SERVICE: 


02/15/2018





PRESENTING COMPLAINT:


Acute abdominal pain





HISTORY OF PRESENT ILLNESS:


63-year-old female who for 3 days had increasing abdominal pain more so on the 

right upper quadrant with associated nausea and vomiting couple of loose stools 

no obvious fever.  Pain continued to increase and presented to the emergency 

department.  Computed tomography scan showed gallstones and pericholecystic 

fluid admitted for gallstones and acute cholecystitis.  Currently is status 

post laparoscopic cholecystectomy





INTERVAL HISTORY:


02/15/2018:


Lying in bed remains intubated.  Spontaneous breathing trial attempted this 

morning with no success, with some concern for right to left shunt and  2-D 

echo completed with a bubble study and it was negative.  As such patient 

remains intubated and Remains on FiO2 of 60% PEEP 5.  Propofol for sedation 

antibiotics in place.





REVIEW OF SYSTEMS:


Unable to assess due to condition 





CURRENT MEDICATIONS


Norco, albuterol, Norvasc, Symbicort, Peridex, cholecalciferol, Colace, Lovenox

, fenofibrate, Lasix, Dilaudid, Toradol, meropenem, Solu-Medrol, Reglan, 

Protonix, Pravachol, propofol.  Tramadol.





PHYSICAL EXAM


VITAL SIGNS: 


Temperature 98.8, pulse 74, respiration 13, blood pressure 119/68, oxygen 

saturation 97% on mechanical ventilation 100% FiO2.





GENERAL APPEARANCE:  Lying in bed, sedated and on the ventilator not in 

distress. 


HENT: Normocephalic, JVD not raised. Mass not palpable.  Oral cavity ET tube 

and OG tube in place, external appearance of ears and nose normal.


EYES:Pupils equal. Conjunctiva normal.


RESPIRATORY: Respiratory effort assisted by ventilator. Lungs diminished to 

auscultation. 


CARDIOVASCULAR: First and second sounds normal. No edema. 


ABDOMEN: Soft.  5 puncture sites clean dry and intact closed with skin glue, 

right upper quadrant with dressing and AIDAN drain with serosanguineous drainage.  

Liver and spleen not palpable. No tenderness. No mass palpable. 


PSYCHIATRY: Unable to assess due to condition





INVESTIGATIONS:


LABS: White blood cell count 10.8, BUN 21, creatinine 0.80.  , ALTs 162, 

alkaline phosphatase 130, total protein 5.2, albumin 2.7.


Chest CTA: Correlate for bilateral lower lobe pneumonia, no evidence of 

pulmonary embolism





ASSESSMENT:


-Acute gangrenous cholecystitis followed by laparoscopic cholecystectomy, now 

AIDAN drain in place causing sepsis present on admission.


-Postoperative hypoxic respiratory failure unexpected most likely related to 

bibasilar atelectasis and underlying COPD concerns for left-to-right shunt, 

requiring mechanical ventilation support.


-Gallstones followed by cholecystectomy.


-Hepatics steatosis.


-Morbid obesity BMI 40.7.


-Chronic obstructive pulmonary disease in ex-smoker.


-Hyperlipidemia.


-Essential hypertension.


-Scoliosis.





PLAN:


Continue mechanical ventilation titrate oxygen down with spontaneous breathing 

trials scheduled daily until patient is successfully extubated.  Continue 

sedation, bronchodilators antibiotics and steroids for underlying COPD.  Plan 

of care discussed the bedside will follow closely.





NP statement: Patient was seen and examined by nurse practitioner Ligia Peralta and all elements of the case discussed with attending  Dr. Peña

## 2018-02-15 NOTE — P.PN
<Carmelina Damico - Last Filed: 02/15/18 14:21>





Subjective


Progress Note Date: 02/15/18





63-year-old female being seen in the intensive care unit is orally intubated on 

mechanical ventilation.  Patients being followed by the intensivist for ICU 

management    patient is postop laparoscopic cholecystectomy for acute 

gangrenous cholecystitis done on February 14.  Postoperatively the patient was 

extubated in the recovery room needed to be reintubated due to acute hypoxic 

respiratory failure.  Patient was transferred to the intensive care unit on 

mechanical ventilation.  Currently on vent support sedated does open eyes to 

verbal stimuli.





Patient did have an echocardiogram done this morning it showed mild LVH.  Left 

ventricular systolic function normal with an EF between 55 and 60% no evidence 

of pulmonary hypertension.  No evidence of a shunt.





Objective





- Vital Signs


Vital signs: 


 Vital Signs











Temp  99 F   02/15/18 12:00


 


Pulse  82   02/15/18 14:00


 


Resp  20   02/15/18 14:00


 


BP  122/72   02/15/18 14:00


 


Pulse Ox  89 L  02/15/18 14:00








 Intake & Output











 02/14/18 02/15/18 02/15/18





 18:59 06:59 18:59


 


Intake Total 2100 1360.563 900


 


Output Total 


 


Balance 1590 670.563 -1295


 


Weight  130 kg 


 


Intake:   


 


  IV 2100 1000 800


 


    Lactated Ringers 1,000 ml  1000 600





    @ 125 mls/hr IV .Q8H CARYN   





    Rx#:[id number]   


 


    Meropenem 1 gm In Sodium   100





    Chloride 0.9% 100 ml @   





    100 mls/hr IVPB Q8HR C[facility]N   





    Rx#:[id number]   


 


    Potassium Chloride 10 meq   100





    In Water For Injection 1   





    100ml.bag @ 100 mls/hr   





    IVPB Q1H C[facility]N Rx#:   





    [id number]   


 


  Intake, IV Titration  360.563 100





  Amount   


 


    Meropenem 1 gm In Sodium  100 





    Chloride 0.9% 100 ml @   





    100 mls/hr IVPB Q8HR CARYN   





    Rx#:[id number]   


 


    Propofol 1,000 mg In  260.563 100





    Empty Bag 1 bag @ Titrate   





    IV .Q0M C[facility]N Rx#:   





    [id number]   


 


Output:   


 


  Drainage  155 150


 


    Right Abdomen  155 150


 


  Urine 


 


  Estimated Blood Loss 100  


 


Other:   


 


  Voiding Method  Indwelling Catheter Indwelling Catheter














- Exam





Physical exam


63-year-old female orally intubated on mechanical vent support sedated


Lungs anterior diminished at the bases FiO2 60% with a sat of 90%


Heart S1-S2 audible regular


Abdomen surgical dressing site dry soft nondistended few hypoactive bowel tones 

AIDAN drain right lower quadrant serous drainage noted indwelling Pulido catheter 

in place


Extremities Venodyne's on to the bilateral lower extremities





- Labs


CBC & Chem 7: 


 02/15/18 04:27





 02/15/18 04:27


Labs: 


 Abnormal Lab Results - Last 24 Hours (Table)











  02/14/18 02/14/18 02/15/18 Range/Units





  17:27 19:13 04:27 


 


WBC     (3.8-10.6)  k/uL


 


Neutrophils #     (1.3-7.7)  k/uL


 


ABG pH  7.34 L    (7.35-7.45)  


 


ABG pCO2  51 H    (35-45)  mmHg


 


ABG pO2     ()  mmHg


 


ABG HCO3  27 H    (21-25)  mmol/L


 


ABG Total CO2  29 H    (19-24)  mmol/L


 


ABG O2 Saturation  97.5 H    (94-97)  %


 


BUN    21 H  (7-17)  mg/dL


 


Glucose    117 H  (74-99)  mg/dL


 


POC Glucose (mg/dL)   141 H   (75-99)  mg/dL


 


AST    160 H  (14-36)  U/L


 


ALT    162 H  (9-52)  U/L


 


Alkaline Phosphatase    130 H  ()  U/L


 


Total Protein    5.2 L  (6.3-8.2)  g/dL


 


Albumin    2.7 L  (3.5-5.0)  g/dL














  02/15/18 02/15/18 Range/Units





  04:27 04:58 


 


WBC  10.8 H   (3.8-10.6)  k/uL


 


Neutrophils #  8.7 H   (1.3-7.7)  k/uL


 


ABG pH    (7.35-7.45)  


 


ABG pCO2   49 H  (35-45)  mmHg


 


ABG pO2   77 L  ()  mmHg


 


ABG HCO3   30 H  (21-25)  mmol/L


 


ABG Total CO2   31 H  (19-24)  mmol/L


 


ABG O2 Saturation    (94-97)  %


 


BUN    (7-17)  mg/dL


 


Glucose    (74-99)  mg/dL


 


POC Glucose (mg/dL)    (75-99)  mg/dL


 


AST    (14-36)  U/L


 


ALT    (9-52)  U/L


 


Alkaline Phosphatase    ()  U/L


 


Total Protein    (6.3-8.2)  g/dL


 


Albumin    (3.5-5.0)  g/dL














Assessment and Plan


Assessment: 





Impression


Present on admission 3 day duration of right upper quadrant pain suspect due to 

an acute cholecystitis


CAT scan of the abdomen pelvis on admission showed evidence of cholelithiasis


Postop laparoscopic cholecystectomy due to an acute gangrenous cholecystitis 

done on February 14


Acute hypoxic respiratory failure unclear etiology needs to be urgently 

reintubated postoperatively


Morbid obesity BMI 44


Echocardiogram done February 15 no evidence of shunt no pulmonary hypertension 

left ventricular systolic function normal EF between 55 and 60%








Plan


Continue with the recommendations by the intensivist for management of the ICU


Pulmonary management per the intensivist


Continue recommendations by infectious disease


DVT and GI prophylaxis


Further surgical recommendations pending








Progress note dictated for Dr. Rob rounding on behalf of dr gilbert


The above impression and plan of care have been discussed and directed by 

signing physician. Carmelina Damico nurse practitioner acting as scribe for signing 

physician.





<Kelli Rob N - Last Filed: 02/20/18 04:46>





Objective





- Vital Signs


Vital signs: 


 Vital Signs











Temp  97.2 F L  02/19/18 23:00


 


Pulse  54 L  02/19/18 23:00


 


Resp  16   02/19/18 23:00


 


BP  122/70   02/19/18 23:00


 


Pulse Ox  96   02/19/18 23:00








 Intake & Output











 02/19/18 02/19/18 02/20/18





 06:59 18:59 06:59


 


Intake Total 875 1700 75


 


Output Total 845 1615 80


 


Balance 30 85 -5


 


Weight 133 kg 133 kg 


 


Intake:   


 


   1000 75


 


    Lactated Ringers 1,000 ml 675 900 75





    @ 75 mls/hr IV .[id number]   





    CARYN Rx#:[id number]   


 


    Meropenem 1 gm In Sodium 200 100 





    Chloride 0.9% 100 ml @   





    100 mls/hr IVPB Q8HR C[facility]N   





    Rx#:[id number]   


 


  Oral  700 


 


Output:   


 


  Drainage 70 50 80


 


    Right Abdomen 70 50 80


 


  Urine 775 1565 


 


Other:   


 


  Voiding Method Indwelling Catheter Indwelling Catheter Bedside Commode





   Bedpan


 


  # Voids 1  3


 


  # Bowel Movements  2 














- Labs


CBC & Chem 7: 


 02/18/18 04:57





 02/19/18 03:55


Labs: 


 Abnormal Lab Results - Last 24 Hours (Table)











  02/19/18 02/19/18 02/19/18 Range/Units





  03:55 07:10 12:15 


 


Carbon Dioxide  34 H    (22-30)  mmol/L


 


BUN  34 H    (7-17)  mg/dL


 


Glucose  149 H    (74-99)  mg/dL


 


POC Glucose (mg/dL)   138 H  155 H  (75-99)  mg/dL


 


Magnesium  2.4 H    (1.6-2.3)  mg/dL


 


AST  51 H    (14-36)  U/L


 


ALT  93 H    (9-52)  U/L


 


Total Protein  5.8 L    (6.3-8.2)  g/dL


 


Albumin  3.1 L    (3.5-5.0)  g/dL














  02/19/18 02/19/18 Range/Units





  16:54 20:29 


 


Carbon Dioxide    (22-30)  mmol/L


 


BUN    (7-17)  mg/dL


 


Glucose    (74-99)  mg/dL


 


POC Glucose (mg/dL)  186 H  161 H  (75-99)  mg/dL


 


Magnesium    (1.6-2.3)  mg/dL


 


AST    (14-36)  U/L


 


ALT    (9-52)  U/L


 


Total Protein    (6.3-8.2)  g/dL


 


Albumin    (3.5-5.0)  g/dL














Assessment and Plan


(1) Acute cholecystitis


Current Visit: Yes   Status: Acute   Code(s): K81.0 - ACUTE CHOLECYSTITIS   

SNOMED Code(s): 48155639


   





(2) Acute hypoxemic respiratory failure


Current Visit: Yes   Status: Acute   Code(s): J96.01 - ACUTE RESPIRATORY 

FAILURE WITH HYPOXIA   SNOMED Code(s): 614409499


   





(3) Hypoxemic respiratory failure, chronic


Current Visit: Yes   Status: Acute   Code(s): J96.11 - CHRONIC RESPIRATORY 

FAILURE WITH HYPOXIA   SNOMED Code(s): 120426522


   





(4) Morbid obesity with BMI of 45.0-49.9, adult


Current Visit: Yes   Status: Acute   Code(s): E66.01 - MORBID (SEVERE) OBESITY 

DUE TO EXCESS CALORIES; Z68.42 - BODY MASS INDEX (BMI) 45.0-49.9, ADULT   

SNOMED Code(s): 946410978


   





(5) Status post cholecystectomy


Current Visit: Yes   Status: Acute   Code(s): Z90.49 - ACQUIRED ABSENCE OF 

OTHER SPECIFIED PARTS OF DIGESTIVE TRACT   SNOMED Code(s): 196348430


   





(6) Transaminitis


Current Visit: Yes   Status: Acute   Code(s): R74.0 - NONSPEC ELEV OF LEVELS OF 

TRANSAMNS & LACTIC ACID DEHYDRGNSE   SNOMED Code(s): 839501945

## 2018-02-15 NOTE — PN
PROGRESS NOTE



DATE OF SERVICE:

02/15/18.



ATTENDING NOTE:

The patient seen and examined by me.  I discussed with nurse practitioner, Ms. Peralta.

The patient is status post cholecystectomy laparoscopically, currently on the

ventilator.  Attempts of weaning were not successful.  Hence, a 2-D echocardiogram was

ordered with a bubble study, came back to be negative.  The patient has a FiO2 of 60

and a PEEP of 5 and on propofol.



PHYSICAL EXAMINATION:

Temperature 99, pulse 97, respiratory 23, blood pressure 112/73, pulse ox 92% on the

vent.

LUNGS:  Diminished breath sounds.  Cardiovascular: Heart sounds muffled.  Abdomen has

got a AIDAN drain in place.



INVESTIGATIONS:

White count 10.8, hemoglobin 12.3, potassium 3.5, BUN 21, creatinine 0.80, bilirubin

0.2.



ASSESSMENT:

1. Acute gangrenous cholecystitis followed by a laparoscopic cholecystectomy.  Now the

    AIDAN drain in place causing sepsis present on admission.

2. Gallstones followed by cholecystectomy.

3. Hepatic steatosis.

4. Morbid obesity BMI 40.7.

5. Chronic obstructive pulmonary disease in an ex-smoker.

6. Hyperlipidemia.

7. Essential hypertension.

8. Scoliosis.

9. Patient currently on ventilator assistance.



PLAN:

The patient is on IV meropenem, IV Solu-Medrol, IV fluids, and propofol.



MMODL / IJN: [id number] / Job#: [id number]

## 2018-02-15 NOTE — ECHOF
Referral Reason:hypoxemia,possiblr rt to lt shunt



MEASUREMENTS

--------

HEIGHT: 170.2 cm

WEIGHT: 129.7 kg

BP: 96/66

IVSd:   1.4 cm     (0.6 - 1.1)

LVIDd:   4.7 cm     (3.9 - 5.3)

LVPWd:   1.2 cm     (0.6 - 1.1)

IVSs:   1.5 cm

LVIDs:   2.4 cm

LVPWs:   1.4 cm

Ao Diam:   3.5 cm     (2.0 - 3.7)

AV Cusp:   2.1 cm     (1.5 - 2.6)

LA Diam:   4.0 cm     (2.7 - 3.8)

MV E Cornelius:   0.84 m/s

MV DecT:   220 ms

MV A Cornelius:   0.85 m/s

MV E/A Ratio:   0.99 

RAP:   5.00 mmHg

RVSP:   12.04 mmHg







FINDINGS

--------

Sinus rhythm.

This was a technically difficult study with suboptimal views.   No subcostals due to surgery

The left ventricular size is normal.   There is mild concentric left ventricular hypertrophy.   Overa
ll left ventricular systolic function is normal with, an EF between 55 - 60 %.

The RV was not well visualized.

The left atrium was not well visualized.

The right atrium was not well visualized.

Contrast study was performed with 2 iv injections of 5 ccs of agitated normal saline, at rest.

No evidence of shunt.

5ml of Lumason was utilized for enhancement of images.

The aortic valve was not well visualized.

The mitral valve leaflets are mildly thickened.   There is trace to mild mitral regurgitation.

Trace tricuspid regurgitation present.   Right ventricular systolic pressure is normal at < 35 mmHg. 
  There is no evidence of pulmonary hypertension.

The pulmonic valve was not well visualized.

The aortic root size is normal.

IVC Not well visulized.

There is no pericardial effusion.



CONCLUSIONS

--------

1. Sinus rhythm.

2. This was a technically difficult study with suboptimal views.

3. No subcostals due to surgery

4. The left ventricular size is normal.

5. There is mild concentric left ventricular hypertrophy.

6. Overall left ventricular systolic function is normal with, an EF between 55 - 60 %.

7. The RV was not well visualized.

8. The left atrium was not well visualized.

9. The right atrium was not well visualized.

10. Contrast study was performed with 2 iv injections of 5 ccs of agitated normal saline, at rest.

11. No evidence of shunt.

12. 5ml of Lumason was utilized for enhancement of images.

13. The aortic valve was not well visualized.

14. The mitral valve leaflets are mildly thickened.

15. There is trace to mild mitral regurgitation.

16. Trace tricuspid regurgitation present.

17. Right ventricular systolic pressure is normal at < 35 mmHg.

18. The pulmonic valve was not well visualized.

19. There is no pericardial effusion.





SONOGRAPHER: Kvng Cline RDCS

## 2018-02-15 NOTE — P.PN
Progress Note - Text


Progress Note Date: 02/15/18





Patient still intubated with attempted weaning in progress. Continue ICU care.

## 2018-02-15 NOTE — P.CNPUL
History of Present Illness


Consult date: 02/15/18


Requesting physician: Neil Trimble


Reason for consult: other (ICU management, patient is on mechanical ventilation)


Chief complaint: Right upper quadrant pain


History of present illness: 





This is a 63-year-old female, known history of COPD, obesity, hypertension, 

hyperlipidemia, history of cardiac arrhythmia in the form of atrial 

fibrillation requiring ablation, patient presented to the ER yesterday with 3 

days history of right upper quadrant pain.  CT of the abdomen and pelvis showed 

evidence of cholelithiasis and pericholecystic fluid.  Patient was seen by 

surgery on consultation, and she underwent laparoscopic cholecystectomy last 

night.  Post operatively, patient was extubated in the recovery room, however 

she had to be reintubated because of hypoxic respiratory failure.  Patient was 

sent to the ICU on mechanical ventilation, and I saw this morning she was still 

on relatively high FiO2 80%, PEEP of 5, and her ABG was rather marginal.  Her 

pO2 was 77 pCO2 was 49 pH of 7.39.  Initial ABG post intubation showed a pO2 of 

99 pCO2 of 51 and pH of 7.34.  The chest x-ray showed minimal bibasilar 

atelectasis, not severe enough to explain the degree of hypoxemia noted on the 

ABG.  Her O2 saturation on 80% was 94%, and as I went down to extubate percent, 

her O2 saturation remained basically about the same, 92%.  Considering that no 

change with dropping down the FiO2, I felt the patient may have to be evaluated 

for possible intracardiac right-to-left shunt.  Hence I recommended an urgent 

echocardiogram/contrast study to rule out an intracardiac shunt.  The patient 

is presently sedated, and not much history could be obtained from the patient.  

Chest x-ray and labs as well as medications were all reviewed.





Review of Systems





Cannot be obtained, no family members at bedside.


ROS unobtainable: due to endotracheal tube





Past Medical History


Past Medical History: COPD, Hyperlipidemia, Hypertension


Additional Past Medical History / Comment(s): S-shaped scoliosis of the spine,


History of Any Multi-Drug Resistant Organisms: None Reported


Past Surgical History: No Surgical Hx Reported, Cardiac Ablation


Additional Past Surgical History / Comment(s): TOOTH EXTRACTIONS.  COLONOSCOPY 

cardiac ablation july 2017


Past Anesthesia/Blood Transfusion Reactions: No Reported Reaction


Past Psychological History: No Psychological Hx Reported


Additional Psychological History / Comment(s): PT LIVES ALONE, WORKS IN A 

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


Smoking Status: Former smoker


Past Alcohol Use History: None Reported


Additional Past Alcohol Use History / Comment(s): 6/2016-QUIT SMOKING


Past Drug Use History: None Reported





- Past Family History


  ** Father


Family Medical History: Congestive Heart Failure (CHF), COPD





  ** Mother


Family Medical History: Congestive Heart Failure (CHF), COPD





Medications and Allergies


 Home Medications











 Medication  Instructions  Recorded  Confirmed  Type


 


Albuterol Inhaler [Ventolin Hfa 2 puff INHALATION RT-Q6H PRN 06/14/16 02/13/18 

History





Inhaler]    


 


Cholecalciferol [Vitamin D3] 2,000 unit PO DAILY 06/14/16 02/13/18 History


 


Fenofibrate [Lofibra] 160 mg PO DAILY 06/14/16 02/13/18 History


 


Mometasone/Formoterol [Dulera 100 2 puff INHALATION RT-BID 06/14/16 02/13/18 

History





Mcg/5 Mcg Inhaler]    


 


Tiotropium Bromide [Spiriva] 1 cap INHALATION RT-DAILY 06/14/16 02/13/18 History


 


Furosemide [Lasix] 40 mg PO DAILY #40 tablet 06/20/16 02/13/18 Rx


 


Pravastatin Sodium [Pravachol] 10 mg PO DAILY 07/10/17 02/13/18 History


 


Albuterol Nebulized [Ventolin 2.5 mg INHALATION RT-Q6H PRN 02/13/18 02/13/18 

History





Nebulized]    


 


amLODIPine [Norvasc] 10 mg PO DAILY 02/13/18 02/13/18 History











 Allergies











Allergy/AdvReac Type Severity Reaction Status Date / Time


 


No Known Allergies Allergy   Verified 02/14/18 13:01














Physical Exam


Vitals: 


 Vital Signs











  Temp Pulse Pulse Resp BP BP BP


 


 02/15/18 10:30   73   20  118/68  


 


 02/15/18 10:00   76   21  117/69  


 


 02/15/18 09:30   74   23  110/66  


 


 02/15/18 09:00   72   24  96/66  


 


 02/15/18 08:30   71   23  102/68  


 


 02/15/18 08:05   72     


 


 02/15/18 08:00  99.7 F H  73   24  100/65  


 


 02/15/18 07:51   72     


 


 02/15/18 07:30   68   24  97/63  


 


 02/15/18 07:00   75   24  106/65  


 


 02/15/18 06:30   68   24  111/72  


 


 02/15/18 06:00   66   24  104/65  


 


 02/15/18 05:30   65   24  109/60  


 


 02/15/18 05:00   62   24  111/73  


 


 02/15/18 04:00  98.7 F  73   24  114/72  


 


 02/15/18 03:30   77   24  117/68  


 


 02/15/18 03:00   75   24  117/75  


 


 02/15/18 02:30   77   24  112/67  


 


 02/15/18 02:00   73   24  117/64  


 


 02/15/18 01:30   78   24  126/73  


 


 02/15/18 01:00   76   24  117/67  


 


 02/15/18 00:30   69   24  101/66  


 


 02/15/18 00:00  98.5 F  71   24  91/51  


 


 02/14/18 23:30   72   24  109/59  


 


 02/14/18 23:15   70   24  109/59  


 


 02/14/18 23:00   73   24  110/64  


 


 02/14/18 22:45   73   21  110/64  


 


 02/14/18 22:30   71   25 H  127/68  


 


 02/14/18 22:15   67   25 H  127/68  


 


 02/14/18 22:00   65   24  117/66  


 


 02/14/18 21:45   66   26 H  109/63  


 


 02/14/18 21:30   64   24  124/62  


 


 02/14/18 21:29   80     


 


 02/14/18 21:20   65   25 H  116/59  


 


 02/14/18 21:17   78     


 


 02/14/18 21:10   65   23  116/59  


 


 02/14/18 21:00   62   24  125/62  


 


 02/14/18 20:50  98.7 F  67   23  116/61  


 


 02/14/18 20:40   63   23  116/61  


 


 02/14/18 20:30   64   24  111/61  


 


 02/14/18 20:20   72   16  107/63  


 


 02/14/18 20:10   61   15  107/63  


 


 02/14/18 20:00   63   15  116/60  


 


 02/14/18 19:50   70   23  149/70  


 


 02/14/18 19:40   70   24  149/70  


 


 02/14/18 19:30  99 F  61   24   


 


 02/14/18 19:20   62   24   


 


 02/14/18 19:19  97.4 F L   65  24   149/70 


 


 02/14/18 19:10   89   24   


 


 02/14/18 19:06   65   24   


 


 02/14/18 18:30    65  16    124/66


 


 02/14/18 18:14    66  16    120/63


 


 02/14/18 18:00    61  16    122/69


 


 02/14/18 17:45    66  16    117/66


 


 02/14/18 17:30    64  16    116/66


 


 02/14/18 17:15    77  16    114/68


 


 02/14/18 17:00    71  16    111/60


 


 02/14/18 16:45    75  16    114/62


 


 02/14/18 16:32  97.4 F L   71  16    137/70


 


 02/14/18 12:53  99.3 F   81  16   132/62 


 


 02/14/18 12:10  99 F   83  24    119/59


 


 02/14/18 12:00   76     


 


 02/14/18 11:50   72     














  Pulse Ox


 


 02/15/18 10:30  92 L


 


 02/15/18 10:00  92 L


 


 02/15/18 09:30  92 L


 


 02/15/18 09:00  94 L


 


 02/15/18 08:30  95


 


 02/15/18 08:05 


 


 02/15/18 08:00  95


 


 02/15/18 07:51 


 


 02/15/18 07:30  95


 


 02/15/18 07:00  95


 


 02/15/18 06:30  94 L


 


 02/15/18 06:00  95


 


 02/15/18 05:30  95


 


 02/15/18 05:00  95


 


 02/15/18 04:00  94 L


 


 02/15/18 03:30  94 L


 


 02/15/18 03:00  94 L


 


 02/15/18 02:30  94 L


 


 02/15/18 02:00  94 L


 


 02/15/18 01:30  94 L


 


 02/15/18 01:00  94 L


 


 02/15/18 00:30  94 L


 


 02/15/18 00:00  94 L


 


 02/14/18 23:30  94 L


 


 02/14/18 23:15  94 L


 


 02/14/18 23:00  94 L


 


 02/14/18 22:45  94 L


 


 02/14/18 22:30  96


 


 02/14/18 22:15  96


 


 02/14/18 22:00  96


 


 02/14/18 21:45  95


 


 02/14/18 21:30  97


 


 02/14/18 21:29 


 


 02/14/18 21:20  97


 


 02/14/18 21:17 


 


 02/14/18 21:10  98


 


 02/14/18 21:00  98


 


 02/14/18 20:50  97


 


 02/14/18 20:40  97


 


 02/14/18 20:30  98


 


 02/14/18 20:20  97


 


 02/14/18 20:10  98


 


 02/14/18 20:00  97


 


 02/14/18 19:50  97


 


 02/14/18 19:40  97


 


 02/14/18 19:30  97


 


 02/14/18 19:20  97


 


 02/14/18 19:19  97


 


 02/14/18 19:10  96


 


 02/14/18 19:06 


 


 02/14/18 18:30  97


 


 02/14/18 18:14  97


 


 02/14/18 18:00  97


 


 02/14/18 17:45  97


 


 02/14/18 17:30  97


 


 02/14/18 17:15  96


 


 02/14/18 17:00  96


 


 02/14/18 16:45  96


 


 02/14/18 16:32  97


 


 02/14/18 12:53  93 L


 


 02/14/18 12:10  91 L


 


 02/14/18 12:00 


 


 02/14/18 11:50 








 Intake and Output











 02/14/18 02/15/18 02/15/18





 22:59 06:59 14:59


 


Intake Total 1475 885.563 500


 


Output Total 595 505 370


 


Balance 880 380.563 130


 


Intake:   


 


  IV 1375 625 500


 


    Lactated Ringers 1,000 ml 375 625 300





    @ 125 mls/hr IV .Q8H CARYN   





    Rx#:[id number]   


 


    Meropenem 1 gm In Sodium   100





    Chloride 0.9% 100 ml @   





    100 mls/hr IVPB Q8HR CARYN   





    Rx#:[id number]   


 


    Potassium Chloride 10 meq   100





    In Water For Injection 1   





    100ml.bag @ 100 mls/hr   





    IVPB Q1H CARYN Rx#:   





    [id number]   


 


  Intake, IV Titration 100 260.563 





  Amount   


 


    Meropenem 1 gm In Sodium 100  





    Chloride 0.9% 100 ml @   





    100 mls/hr IVPB Q8HR CARYN   





    Rx#:[id number]   


 


    Propofol 1,000 mg In  260.563 





    Empty Bag 1 bag @ Titrate   





    IV .Q0M CARYN Rx#:   





    [id number]   


 


Output:   


 


  Drainage  155 150


 


    Right Abdomen  155 150


 


  Urine 595 350 220


 


Other:   


 


  Voiding Method Indwelling Catheter Indwelling Catheter 


 


  Weight  130 kg 














Physical Exam: Revealed a 63-year-old female, obese, on mechanical ventilation, 

has a size 7.0 endotracheal tube in place.


HEENT:[Neck is supple.] [No neck masses.] [No thyromegaly.] [No JVD.]  Moist 

mucous membranes.


Chest: Diminished breath sounds at the bases., no crackles, no rhonchi, no 

wheezes.]


Cardiac Exam: [Normal S1 and S2, no S3 gallop, no murmur.]


Abdomen: [Obese, postsurgical Soft, nontender,  no megaly, no rebound, no 

guarding, diminished bowel sounds.]


Extremities: [No clubbing, no edema, no cyanosis.]


Neurological Exam: [Cannot be assessed, patient is fully sedated on propofol.


Psychiatric: Cannot be assessed patient is sedated


Lymphatics: No lymphadenopathy was appreciated.





Results





- Laboratory Findings


CBC and BMP: 


 02/15/18 04:27





 02/15/18 04:27


ABG











ABG pH  7.39  (7.35-7.45)   02/15/18  04:58    


 


ABG pCO2  49 mmHg (35-45)  H  02/15/18  04:58    


 


ABG pO2  77 mmHg ()  L  02/15/18  04:58    


 


ABG O2 Saturation  96.9 % (94-97)   02/15/18  04:58    








Abnormal lab findings: 


 Abnormal Labs











  02/13/18 02/13/18 02/14/18





  16:30 16:30 06:13


 


WBC  22.8 H   16.2 H


 


RBC  5.78 H  


 


Hct  49.5 H  


 


MCHC    30.8 L


 


Neutrophils #  20.2 H   14.0 H


 


Monocytes #  1.1 H  


 


ABG pH   


 


ABG pCO2   


 


ABG pO2   


 


ABG HCO3   


 


ABG Total CO2   


 


ABG O2 Saturation   


 


Carbon Dioxide   


 


BUN   26 H 


 


Creatinine   1.10 H 


 


Glucose   155 H 


 


POC Glucose (mg/dL)   


 


AST   150 H 


 


ALT   155 H 


 


Alkaline Phosphatase   127 H 


 


Total Protein   


 


Albumin   














  02/14/18 02/14/18 02/14/18





  06:13 17:27 19:13


 


WBC   


 


RBC   


 


Hct   


 


MCHC   


 


Neutrophils #   


 


Monocytes #   


 


ABG pH   7.34 L 


 


ABG pCO2   51 H 


 


ABG pO2   


 


ABG HCO3   27 H 


 


ABG Total CO2   29 H 


 


ABG O2 Saturation   97.5 H 


 


Carbon Dioxide  31 H  


 


BUN  25 H  


 


Creatinine  1.06 H  


 


Glucose  130 H  


 


POC Glucose (mg/dL)    141 H


 


AST  142 H  


 


ALT  161 H  


 


Alkaline Phosphatase   


 


Total Protein  5.6 L  


 


Albumin  3.0 L  














  02/15/18 02/15/18 02/15/18





  04:27 04:27 04:58


 


WBC   10.8 H 


 


RBC   


 


Hct   


 


MCHC   


 


Neutrophils #   8.7 H 


 


Monocytes #   


 


ABG pH   


 


ABG pCO2    49 H


 


ABG pO2    77 L


 


ABG HCO3    30 H


 


ABG Total CO2    31 H


 


ABG O2 Saturation   


 


Carbon Dioxide   


 


BUN  21 H  


 


Creatinine   


 


Glucose  117 H  


 


POC Glucose (mg/dL)   


 


AST  160 H  


 


ALT  162 H  


 


Alkaline Phosphatase  130 H  


 


Total Protein  5.2 L  


 


Albumin  2.7 L  














- Diagnostic Findings


Chest x-ray: image reviewed (Minimal atelectasis at the bases, endotracheal 

tube seems to be intact.  And in proper position.)





Assessment and Plan


Assessment: 


Impression:





1 Postoperative hypoxic respiratory failure, unexpected, most likely related to 

bibasilar atelectasis, underlying COPD, and possible right to left shunt.  The 

possibility of ARDS is in the differential, it is not entirely ruled out, but 

felt to be less likely considering the chest x-ray does not point to diffuse 

interstitial infiltrates.  And no evidence of pulmonary edema.





2 status post laparoscopic cholecystectomy, postoperative day #1.  Patient 

presented with acute gangrenous cholecystitis.





3 history of multiple comorbidities including obesity, benign essential 

hypertension, COPD, hypertriglyceridemia.





Recommendation: Continue present supportive care measures, continue mechanical 

ventilation, patient will be placed on a lower FiO2 presently at 60%, and did 

not notice much of a change in O2 saturation after the drop in the FiO2.  Hence 

patient should be evaluated for possible right-to left shunt.  Echocardiogram 

was ordered.  In the meantime continue bronchodilators, antibiotics, may even 

consider steroids for her underlying COPD, will follow closely.  Considering 

the ABG, the patient is not ready for any weaning trials at this point.  

Critical care time is 45 minutes.





Time with Patient: Greater than 30

## 2018-02-16 LAB
ANION GAP SERPL CALC-SCNC: 11 MMOL/L
BUN SERPL-SCNC: 25 MG/DL (ref 7–17)
CALCIUM SPEC-MCNC: 8.9 MG/DL (ref 8.4–10.2)
CHLORIDE SERPL-SCNC: 102 MMOL/L (ref 98–107)
CO2 BLDA-SCNC: 33 MMOL/L (ref 19–24)
CO2 SERPL-SCNC: 27 MMOL/L (ref 22–30)
ERYTHROCYTE [DISTWIDTH] IN BLOOD BY AUTOMATED COUNT: 4.39 M/UL (ref 3.8–5.4)
ERYTHROCYTE [DISTWIDTH] IN BLOOD: 13.7 % (ref 11.5–15.5)
GLUCOSE BLD-MCNC: 152 MG/DL (ref 75–99)
GLUCOSE BLD-MCNC: 161 MG/DL (ref 75–99)
GLUCOSE BLD-MCNC: 161 MG/DL (ref 75–99)
GLUCOSE SERPL-MCNC: 165 MG/DL (ref 74–99)
HBA1C MFR BLD: 6.2 % (ref 4–6)
HCO3 BLDA-SCNC: 31 MMOL/L (ref 21–25)
HCT VFR BLD AUTO: 38.6 % (ref 34–46)
HGB BLD-MCNC: 11.8 GM/DL (ref 11.4–16)
MAGNESIUM SPEC-SCNC: 2.3 MG/DL (ref 1.6–2.3)
MCH RBC QN AUTO: 27 PG (ref 25–35)
MCHC RBC AUTO-ENTMCNC: 30.7 G/DL (ref 31–37)
MCV RBC AUTO: 87.9 FL (ref 80–100)
PCO2 BLDA: 48 MMHG (ref 35–45)
PH BLDA: 7.43 [PH] (ref 7.35–7.45)
PLATELET # BLD AUTO: 262 K/UL (ref 150–450)
PO2 BLDA: 65 MMHG (ref 83–108)
POTASSIUM SERPL-SCNC: 3.9 MMOL/L (ref 3.5–5.1)
SODIUM SERPL-SCNC: 140 MMOL/L (ref 137–145)
WBC # BLD AUTO: 9.4 K/UL (ref 3.8–10.6)

## 2018-02-16 PROCEDURE — B246ZZ4 ULTRASONOGRAPHY OF RIGHT AND LEFT HEART, TRANSESOPHAGEAL: ICD-10-PCS

## 2018-02-16 RX ADMIN — METHYLPREDNISOLONE SODIUM SUCCINATE SCH MG: 40 INJECTION, POWDER, FOR SOLUTION INTRAMUSCULAR; INTRAVENOUS at 12:23

## 2018-02-16 RX ADMIN — METHYLPREDNISOLONE SODIUM SUCCINATE SCH MG: 40 INJECTION, POWDER, FOR SOLUTION INTRAMUSCULAR; INTRAVENOUS at 17:01

## 2018-02-16 RX ADMIN — FUROSEMIDE SCH MG: 40 TABLET ORAL at 09:31

## 2018-02-16 RX ADMIN — MEROPENEM SCH MLS/HR: 1 INJECTION, POWDER, FOR SOLUTION INTRAVENOUS at 00:37

## 2018-02-16 RX ADMIN — PANTOPRAZOLE SODIUM SCH MG: 40 INJECTION, POWDER, FOR SOLUTION INTRAVENOUS at 09:30

## 2018-02-16 RX ADMIN — Medication SCH: at 12:23

## 2018-02-16 RX ADMIN — IPRATROPIUM BROMIDE SCH MG: 0.5 SOLUTION RESPIRATORY (INHALATION) at 11:35

## 2018-02-16 RX ADMIN — MEROPENEM SCH MLS/HR: 1 INJECTION, POWDER, FOR SOLUTION INTRAVENOUS at 15:47

## 2018-02-16 RX ADMIN — FENOFIBRATE SCH MG: 160 TABLET ORAL at 09:31

## 2018-02-16 RX ADMIN — PROPOFOL SCH MLS/HR: 10 INJECTION, EMULSION INTRAVENOUS at 00:49

## 2018-02-16 RX ADMIN — PROPOFOL SCH MLS/HR: 10 INJECTION, EMULSION INTRAVENOUS at 14:07

## 2018-02-16 RX ADMIN — INSULIN ASPART SCH UNIT: 100 INJECTION, SOLUTION INTRAVENOUS; SUBCUTANEOUS at 12:22

## 2018-02-16 RX ADMIN — IPRATROPIUM BROMIDE SCH MG: 0.5 SOLUTION RESPIRATORY (INHALATION) at 16:15

## 2018-02-16 RX ADMIN — POTASSIUM CHLORIDE SCH MLS/HR: 14.9 INJECTION, SOLUTION INTRAVENOUS at 23:00

## 2018-02-16 RX ADMIN — CHLORHEXIDINE GLUCONATE SCH ML: 1.2 RINSE ORAL at 09:30

## 2018-02-16 RX ADMIN — IPRATROPIUM BROMIDE SCH MG: 0.5 SOLUTION RESPIRATORY (INHALATION) at 20:34

## 2018-02-16 RX ADMIN — MEROPENEM SCH MLS/HR: 1 INJECTION, POWDER, FOR SOLUTION INTRAVENOUS at 09:29

## 2018-02-16 RX ADMIN — PROPOFOL SCH MLS/HR: 10 INJECTION, EMULSION INTRAVENOUS at 10:48

## 2018-02-16 RX ADMIN — DOCUSATE SODIUM SCH: 100 CAPSULE, LIQUID FILLED ORAL at 09:30

## 2018-02-16 RX ADMIN — PROPOFOL SCH MLS/HR: 10 INJECTION, EMULSION INTRAVENOUS at 20:50

## 2018-02-16 RX ADMIN — IPRATROPIUM BROMIDE SCH MG: 0.5 SOLUTION RESPIRATORY (INHALATION) at 08:13

## 2018-02-16 RX ADMIN — PROPOFOL SCH MLS/HR: 10 INJECTION, EMULSION INTRAVENOUS at 16:53

## 2018-02-16 RX ADMIN — POTASSIUM CHLORIDE SCH: 14.9 INJECTION, SOLUTION INTRAVENOUS at 09:27

## 2018-02-16 RX ADMIN — PROPOFOL SCH MLS/HR: 10 INJECTION, EMULSION INTRAVENOUS at 07:09

## 2018-02-16 RX ADMIN — PRAVASTATIN SODIUM SCH MG: 20 TABLET ORAL at 09:31

## 2018-02-16 RX ADMIN — ENOXAPARIN SODIUM SCH MG: 40 INJECTION SUBCUTANEOUS at 09:31

## 2018-02-16 RX ADMIN — POTASSIUM CHLORIDE SCH: 14.9 INJECTION, SOLUTION INTRAVENOUS at 15:47

## 2018-02-16 RX ADMIN — METHYLPREDNISOLONE SODIUM SUCCINATE SCH MG: 40 INJECTION, POWDER, FOR SOLUTION INTRAMUSCULAR; INTRAVENOUS at 06:51

## 2018-02-16 RX ADMIN — INSULIN ASPART SCH UNIT: 100 INJECTION, SOLUTION INTRAVENOUS; SUBCUTANEOUS at 00:43

## 2018-02-16 RX ADMIN — CHLORHEXIDINE GLUCONATE SCH ML: 1.2 RINSE ORAL at 21:40

## 2018-02-16 RX ADMIN — PROPOFOL SCH MLS/HR: 10 INJECTION, EMULSION INTRAVENOUS at 03:34

## 2018-02-16 RX ADMIN — METHYLPREDNISOLONE SODIUM SUCCINATE SCH MG: 40 INJECTION, POWDER, FOR SOLUTION INTRAMUSCULAR; INTRAVENOUS at 00:39

## 2018-02-16 RX ADMIN — INSULIN ASPART SCH UNIT: 100 INJECTION, SOLUTION INTRAVENOUS; SUBCUTANEOUS at 17:01

## 2018-02-16 RX ADMIN — INSULIN ASPART SCH UNIT: 100 INJECTION, SOLUTION INTRAVENOUS; SUBCUTANEOUS at 06:50

## 2018-02-16 RX ADMIN — BUDESONIDE AND FORMOTEROL FUMARATE DIHYDRATE SCH: 80; 4.5 AEROSOL RESPIRATORY (INHALATION) at 08:12

## 2018-02-16 NOTE — P.CRDCN
History of Present Illness


Consult date: 02/16/18


History of present illness: 


This is a 63-year-old female with history of COPD, obesity, hypertension, 

hyperlipidemia with history of supplement clot tachycardia requiring ablation 

who was admitted to the hospital with a right upper quadrant pain.  Computed 

tomography scan of the abdomen and pelvis showed evidence of clearly calcis and 

pericholecystic fluid.  Patient underwent laparoscopic cholecystectomy.  

Postoperatively patient was extubated in the recovery room.  However, she had 

to be reintubated because of hypoxic respiratory failure.  Patient has been on 

mechanical ventilation and has been having trouble with oxygenation.  The 

possibility of shunt was suspected.  A transthoracic echo was requested with 

contrast.  However, the quality of the study is suboptimal and no definite 

shunt would be detected.  Computed tomography scan of the chest wasn't were 

negative for pulmonary emboli.  Pulmonary artery was prominent suggestive of 

pulmonary hypertension.  Patient is also showing episodes of bradycardia but 

seems to be mostly sinus rhythm with frequent APCs with compensated pauses.  We'

ll proceed with a ALFIE examination to rule out the possibility of any 

intracardiac shunt.








Review of Systems





Not obtained





Past Medical History


Past Medical History: COPD, Hyperlipidemia, Hypertension


Additional Past Medical History / Comment(s): S-shaped scoliosis of the spine,


History of Any Multi-Drug Resistant Organisms: None Reported


Past Surgical History: No Surgical Hx Reported, Cardiac Ablation


Additional Past Surgical History / Comment(s): TOOTH EXTRACTIONS.  COLONOSCOPY 

cardiac ablation july 2017


Past Anesthesia/Blood Transfusion Reactions: No Reported Reaction


Past Psychological History: No Psychological Hx Reported


Additional Psychological History / Comment(s): PT LIVES ALONE, WORKS IN A 

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


Smoking Status: Former smoker


Past Alcohol Use History: None Reported


Additional Past Alcohol Use History / Comment(s): 6/2016-QUIT SMOKING


Past Drug Use History: None Reported





- Past Family History


  ** Father


Family Medical History: Congestive Heart Failure (CHF), COPD





  ** Mother


Family Medical History: Congestive Heart Failure (CHF), COPD





Medications and Allergies


 Home Medications











 Medication  Instructions  Recorded  Confirmed  Type


 


Albuterol Inhaler [Ventolin Hfa 2 puff INHALATION RT-Q6H PRN 06/14/16 02/13/18 

History





Inhaler]    


 


Cholecalciferol [Vitamin D3] 2,000 unit PO DAILY 06/14/16 02/13/18 History


 


Fenofibrate [Lofibra] 160 mg PO DAILY 06/14/16 02/13/18 History


 


Mometasone/Formoterol [Dulera 100 2 puff INHALATION RT-BID 06/14/16 02/13/18 

History





Mcg/5 Mcg Inhaler]    


 


Tiotropium Bromide [Spiriva] 1 cap INHALATION RT-DAILY 06/14/16 02/13/18 History


 


Furosemide [Lasix] 40 mg PO DAILY #40 tablet 06/20/16 02/13/18 Rx


 


Pravastatin Sodium [Pravachol] 10 mg PO DAILY 07/10/17 02/13/18 History


 


Albuterol Nebulized [Ventolin 2.5 mg INHALATION RT-Q6H PRN 02/13/18 02/13/18 

History





Nebulized]    


 


amLODIPine [Norvasc] 10 mg PO DAILY 02/13/18 02/13/18 History











 Allergies











Allergy/AdvReac Type Severity Reaction Status Date / Time


 


No Known Allergies Allergy   Verified 02/14/18 13:01














Physical Exam


Vitals: 


 Vital Signs











  Temp Pulse Pulse Resp BP Pulse Ox


 


 02/16/18 08:39   54 L    


 


 02/16/18 08:13   56 L    


 


 02/16/18 07:00   57 L   20  126/69  93 L


 


 02/16/18 06:30   55 L   20  119/72  96


 


 02/16/18 06:00   51 L   20  117/64  94 L


 


 02/16/18 05:30   46 L   19  113/65  93 L


 


 02/16/18 05:00   48 L   20  114/68  93 L


 


 02/16/18 04:30   53 L   19  108/63  93 L


 


 02/16/18 04:00   56 L   19  112/66  94 L


 


 02/16/18 03:42     20  


 


 02/16/18 03:30   56 L   20  118/67  94 L


 


 02/16/18 03:00   57 L   20  106/62  93 L


 


 02/16/18 02:30   47 L   20  106/64  94 L


 


 02/16/18 02:00   54 L   20  111/68  93 L


 


 02/16/18 01:30   51 L   20  107/62  93 L


 


 02/16/18 01:00   58 L   20  113/79  91 L


 


 02/16/18 00:30   62   20  101/63  94 L


 


 02/16/18 00:00  98.9 F  48 L   20  100/59  93 L


 


 02/15/18 23:30   57 L   20  104/60  93 L


 


 02/15/18 23:00   47 L   19  96/59  93 L


 


 02/15/18 22:30   58 L   19  97/60  93 L


 


 02/15/18 22:00   52 L   20  100/62  93 L


 


 02/15/18 21:30   58 L   19  98/59  92 L


 


 02/15/18 21:00   59 L   22  103/61  94 L


 


 02/15/18 20:30  98.3 F  61   20  122/68  93 L


 


 02/15/18 20:15   72    


 


 02/15/18 20:03   69    


 


 02/15/18 20:00  98.3 F  60  65  20  99/59  94 L


 


 02/15/18 19:59       94 L


 


 02/15/18 19:30   53 L   19  90/55  94 L


 


 02/15/18 19:00   58 L   19  96/57  94 L


 


 02/15/18 18:30   77   20  119/70  93 L


 


 02/15/18 18:00   60   20  113/61  98


 


 02/15/18 17:30   67   19  117/63  97


 


 02/15/18 17:00   72   20  119/64  97


 


 02/15/18 16:58   68    


 


 02/15/18 16:44   74    


 


 02/15/18 16:30  98.8 F  74   13  119/68  97


 


 02/15/18 16:00  98.8 F  74   14  119/68  94 L


 


 02/15/18 15:30   83   25 H  119/66  95


 


 02/15/18 15:00   74   20  118/64  95


 


 02/15/18 14:30   81   20  111/63  90 L


 


 02/15/18 14:00   82   20  122/72  89 L


 


 02/15/18 13:30   79   20  119/64  92 L


 


 02/15/18 13:00   82   20  148/66  90 L


 


 02/15/18 12:30   84   20  110/64  92 L


 


 02/15/18 12:00  99 F  77   23  112/73  92 L


 


 02/15/18 11:39   77    


 


 02/15/18 11:30   78   24  131/70  92 L


 


 02/15/18 11:00   73   20  113/62  92 L


 


 02/15/18 10:30   73   20  118/68  92 L


 


 02/15/18 10:00   76   21  117/69  92 L








 Intake and Output











 02/15/18 02/16/18 02/16/18





 22:59 06:59 14:59


 


Intake Total 825 972.35 100


 


Output Total 890 330 40


 


Balance -65 642.35 60


 


Intake:   


 


   675 100


 


    Lactated Ringers 1,000 ml 525 375 





    @ 75 mls/hr IV .[id number]   





    CARYN Rx#:[id number]   


 


    Meropenem 1 gm In Sodium 100  





    Chloride 0.9% 100 ml @   





    100 mls/hr IVPB Q8HR CARYN   





    Rx#:[id number]   


 


    Potassium Chloride 10 meq 100 300 100





    In Water For Injection 1   





    100ml.bag @ 100 mls/hr   





    IVPB Q1H CARYN Rx#:   





    [id number]   


 


  Intake, IV Titration 100 297.35 





  Amount   


 


    Propofol 1,000 mg In 100 297.35 





    Empty Bag 1 bag @ Titrate   





    IV .Q0M CARYN Rx#:   





    [id number]   


 


Output:   


 


  Drainage 100  


 


    Right Abdomen 100  


 


  Urine 790 330 40


 


Other:   


 


  Voiding Method Indwelling Catheter Indwelling Catheter 


 


  Weight  127.6 kg 














GENERAL EXAM: Patient is intubated and sedated


HEENT: Normocephalic.


NECK: No masses, no nuchal rigidity.


CHEST: No chest wall deformity.


LUNGS: Diminished breath sounds at bases


HEART: Distant heart sounds


ABDOMEN: No hepatosplenomegaly, normal bowel sounds, no guarding or rigidity.


SKIN: No rashes


CENTRAL NERVOUS SYSTEM: Deferred


EXTREMITIES: No cyanosis, clubbing or edema.





Results





 02/16/18 04:47





 02/16/18 04:47


 CBC











  02/16/18 Range/Units





  04:47 


 


WBC  9.4  (3.8-10.6)  k/uL


 


RBC  4.39  (3.80-5.40)  m/uL


 


Hgb  11.8  (11.4-16.0)  gm/dL


 


Hct  38.6  (34.0-46.0)  %


 


Plt Count  262  (150-450)  k/uL








 Comprehensive Metabolic Panel











  02/15/18 02/15/18 02/16/18 Range/Units





  15:50 21:53 04:47 


 


Sodium    140  (137-145)  mmol/L


 


Potassium  3.9  4.0  3.9  (3.5-5.1)  mmol/L


 


Chloride    102  ()  mmol/L


 


Carbon Dioxide    27  (22-30)  mmol/L


 


BUN    25 H  (7-17)  mg/dL


 


Creatinine    0.70  (0.52-1.04)  mg/dL


 


Glucose    165 H  (74-99)  mg/dL


 


Calcium    8.9  (8.4-10.2)  mg/dL








 Current Medications











Generic Name Dose Route Start Last Admin





  Trade Name Freq  PRN Reason Stop Dose Admin


 


Hydrocodone Bitart/Acetaminophen  2 each  02/14/18 15:41  





  Bourg 5-325  PO   





  Q6HR PRN   





  Moderate to Severe Pain   


 


Albuterol Sulfate  2.5 mg  02/13/18 21:55  02/14/18 21:17





  Ventolin Nebulized  INHALATION   2.5 mg





  RT-Q6H PRN   Administration





  Shortness Of Breath   


 


Amlodipine Besylate  10 mg  02/14/18 09:00  02/15/18 09:32





  Norvasc  PO   10 mg





  DAILY CARYN   Administration


 


Budesonide/Formoterol Fumarate  2 puff  02/14/18 08:00  02/16/18 08:12





  Symbicort 80-4.5 Mcg Inhaler  INHALATION   Not Given





  RT-BID [facility]   


 


Chlorhexidine Gluconate  15 ml  02/14/18 21:30  02/16/18 09:30





  Peridex  MUCOUS MEM   15 ml





  BID [facility]   Administration


 


Cholecalciferol  2,000 unit  02/14/18 12:00  02/15/18 11:48





  Vitamin D3  PO   Not Given





  DAILY@1200 [facility]   


 


Docusate Sodium  100 mg  02/14/18 21:00  02/16/18 09:30





  Colace  PO   Not Given





  BID [facility]   


 


Enoxaparin Sodium  40 mg  02/15/18 09:00  02/16/18 09:31





  Lovenox  SQ   40 mg





  DAILY [facility]   Administration


 


Fenofibrate  160 mg  02/14/18 09:00  02/16/18 09:31





  Lofibra  PO   160 mg





  DAILY [facility]   Administration


 


Furosemide  40 mg  02/14/18 09:00  02/16/18 09:31





  Lasix  PO   40 mg





  DAILY [facility]   Administration


 


Hydromorphone HCl  0.5 mg  02/14/18 15:41  02/15/18 18:32





  Dilaudid  IVP   0.5 mg





  Q3HR PRN   Administration





  Moderate to Severe Pain   


 


Lactated Ringer's  1,000 mls @ 75 mls/hr  02/14/18 11:30  02/16/18 09:27





  Lactated Ringers  IV   Not Given





  .[id number] [facility]   


 


Meropenem 1 gm/ Sodium  100 mls @ 100 mls/hr  02/14/18 18:00  02/16/18 09:29





  Chloride  IVPB   100 mls/hr





  Q8HR CARYN   Administration


 


Propofol 1,000 mg/ IV Solution  100 mls @ 0 mls/hr  02/14/18 20:45  02/16/18 07:

09





  IV   50.02 mcg/kg/min





  .Q0M CARYN   35.4 mls/hr





  Protocol   Administration





  Titrate   


 


Insulin Aspart  0 unit  02/15/18 20:00  02/16/18 06:50





  Novolog  SQ   2 unit





  Q6HR CARYN   Administration





  Protocol   


 


Ipratropium Bromide  0.5 mg  02/14/18 08:00  02/16/18 08:13





  Atrovent Nebulized  INHALATION   0.5 mg





  RT-QID [facility]   Administration


 


Ketorolac Tromethamine  30 mg  02/13/18 19:55  





  Toradol  IVP  02/18/18 19:56  





  Q6HR PRN   





  Moderate Pain   


 


Methylprednisolone Sodium Succinate  40 mg  02/15/18 12:00  02/16/18 06:51





  Solu-Medrol  IV   40 mg





  Q6HR CARYN   Administration


 


Metoclopramide HCl  10 mg  02/14/18 15:41  





  Reglan  IVP   





  Q6H PRN   





  Nausea And Vomiting   


 


Miscellaneous Information  1 each  02/15/18 05:21  





  Potassium Per Protocol  MISCELLANE   





  DAILY PRN   





  Per Protocol   





  Protocol   


 


Miscellaneous Information  1 each  02/15/18 14:44  





  Rx Info: Iv Contrast Was Given  MISCELLANE  02/17/18 14:46  





  DAILY PRN   





  Per Protocol   


 


Miscellaneous Information  1 each  02/15/18 14:50  





  Rx Info: Iv Contrast Was Given  MISCELLANE  02/17/18 14:50  





  DAILY PRN   





  Per Protocol   


 


Naloxone HCl  0.2 mg  02/13/18 19:55  





  Narcan  IV   





  Q2M PRN   





  Opioid Reversal   


 


Ondansetron HCl  4 mg  02/14/18 15:41  





  Zofran  IVP   





  Q8HR PRN   





  Nausea And Vomiting   


 


Pantoprazole Sodium  40 mg  02/15/18 09:00  02/16/18 09:30





  Protonix  IV   40 mg





  DAILY CARYN   Administration


 


Pravastatin Sodium  10 mg  02/14/18 09:00  02/16/18 09:31





  Pravachol  PO   10 mg





  DAILY CARYN   Administration


 


Tramadol HCl  50 mg  02/14/18 15:41  





  Ultram  PO   





  Q6H PRN   





  Mild to Moderate Pain   








 Intake and Output











 02/15/18 02/16/18 02/16/18





 22:59 06:59 14:59


 


Intake Total 825 972.35 100


 


Output Total 890 330 40


 


Balance -65 642.35 60


 


Intake:   


 


   675 100


 


    Lactated Ringers 1,000 ml 525 375 





    @ 75 mls/hr IV .[id number]   





    CARYN Rx#:[id number]   


 


    Meropenem 1 gm In Sodium 100  





    Chloride 0.9% 100 ml @   





    100 mls/hr IVPB Q8HR CARYN   





    Rx#:[id number]   


 


    Potassium Chloride 10 meq 100 300 100





    In Water For Injection 1   





    100ml.bag @ 100 mls/hr   





    IVPB Q1H CARYN Rx#:   





    [id number]   


 


  Intake, IV Titration 100 297.35 





  Amount   


 


    Propofol 1,000 mg In 100 297.35 





    Empty Bag 1 bag @ Titrate   





    IV .Q0M CARYN Rx#:   





    [id number]   


 


Output:   


 


  Drainage 100  


 


    Right Abdomen 100  


 


  Urine 790 330 40


 


Other:   


 


  Voiding Method Indwelling Catheter Indwelling Catheter 


 


  Weight  127.6 kg 








 





 02/16/18 04:47 





 02/16/18 04:47 











EKG Interpretations (text)





Sinus rhythm with frequent APCs





Assessment and Plan


(1) Acute hypoxemic respiratory failure


Current Visit: Yes   Status: Acute   Code(s): J96.01 - ACUTE RESPIRATORY 

FAILURE WITH HYPOXIA   SNOMED Code(s): 049633166


   





(2) Status post cholecystectomy


Current Visit: Yes   Status: Acute   Code(s): Z90.49 - ACQUIRED ABSENCE OF 

OTHER SPECIFIED PARTS OF DIGESTIVE TRACT   SNOMED Code(s): 609495127


   





(3) Congestive heart failure


Current Visit: No   Status: Acute   Code(s): I50.9 - HEART FAILURE, UNSPECIFIED

   SNOMED Code(s): 19723706


   


Plan: 


This patient is having difficulty with oxygenation.  Transthoracic study is 

suboptimal.  We will proceed with a ALFIE examination to rule out any 

intracardiac shunt.  Patient also has bradycardia but seemed to be more is 

sinus rhythm with APCs followed be compensated pauses.  Further recommends will 

depend upon the findings on the ALFIE.

## 2018-02-16 NOTE — P.PN
Progress Note - Text


Progress Note Date: 18


DATE OF SERVICE: 


2018





PRESENTING COMPLAINT:


Acute abdominal pain





HISTORY OF PRESENT ILLNESS:


63-year-old female who for 3 days had increasing abdominal pain more so on the 

right upper quadrant with associated nausea and vomiting couple of loose stools 

no obvious fever.  Pain continued to increase and presented to the emergency 

department.  Computed tomography scan showed gallstones and pericholecystic 

fluid admitted for gallstones and acute cholecystitis.  Currently is status 

post laparoscopic cholecystectomy





INTERVAL HISTORY:


2018:


Lying in bed remains intubated.  Spontaneous breathing trial attempted this 

morning with no success.  Concerns for an intracardiac shunt.  Echocardiogram 

was nondiagnostic that was performed today.  ALFIE expect to be performed later 

today.  There appears to be no explanation for her profound hypoxemia and 

patient does not respond well with increased PEEP or FiO2.  Patient also was 

noted to be bradycardic.





02/15/2018:


Lying in bed remains intubated.  Spontaneous breathing trial attempted this 

morning with no success, with some concern for right to left shunt and  2-D 

echo completed with a bubble study and it was negative.  As such patient 

remains intubated and Remains on FiO2 of 60% PEEP 5.  Propofol for sedation 

antibiotics in place.





REVIEW OF SYSTEMS:


Unable to assess due to condition 





CURRENT MEDICATIONS


Norco, albuterol, Norvasc, Symbicort, Peridex, cholecalciferol, Colace, Lovenox

, fenofibrate, Lasix, Dilaudid, Toradol, meropenem, Solu-Medrol, Reglan, 

Protonix, Pravachol, propofol.  Tramadol.





PHYSICAL EXAM


VITAL SIGNS: 


Temperature 98.2, pulse 47, respiratory rate 23, oxygen saturation 92% on 60% 

mechanical ventilation.  Blood pressure 96/58.





GENERAL APPEARANCE:  Lying in bed, sedated and on the ventilator not in 

distress. 


HENT: Normocephalic, JVD not raised. Mass not palpable.  Oral cavity ET tube 

and OG tube in place, external appearance of ears and nose normal.


EYES:Pupils equal. Conjunctiva normal.


RESPIRATORY: Respiratory effort assisted by ventilator. Lungs diminished to 

auscultation. 


CARDIOVASCULAR: First and second sounds normal. No edema. 


ABDOMEN: Soft.  5 puncture sites clean dry and intact closed with skin glue, 

right upper quadrant with dressing and AIDAN drain with serosanguineous drainage.  

Liver and spleen not palpable. No tenderness. No mass palpable. 


PSYCHIATRY: Unable to assess due to condition, however when sedation is lifted 

patient is able to follow commands appropriately





INVESTIGATIONS:


LABS: CBC unremarkable, BUN 25, Accu-Cheks noted.


AB.43/48/65/31/93.1/7.0


Chest x-ray: Findings are similar to prior exam probable bibasilar atelectasis 

versus pneumonia cardiomegaly.


ALFIE: Normal valvular function, normal LV function, no evidence of intracardiac 

shunt.





ASSESSMENT:


-Acute gangrenous cholecystitis followed by laparoscopic cholecystectomy, now 

AIDAN drain in place causing sepsis present on admission.


-Postoperative hypoxic respiratory failure unexpected most likely related to 

bibasilar atelectasis and underlying COPD concerns for left-to-right shunt, 

requiring mechanical ventilation support.


-Gallstones followed by cholecystectomy.


-Hepatics steatosis.


-Morbid obesity BMI 40.7.


-Chronic obstructive pulmonary disease in ex-smoker.


-Hyperlipidemia.


-Essential hypertension.


-Scoliosis.





PLAN:


Continue mechanical ventilation titrate oxygen down with spontaneous breathing 

trials will be continued only when patient's oxygenation improves.  Continue 

sedation, bronchodilators antibiotics and steroids for underlying COPD. ALFIE 

showed no evidence of intracardiac shunt we'll continue to investigate other 

reasons for profound hypoxemia.  Plan of care discussed the bedside will follow 

closely.





NP statement: Patient was seen and examined by nurse practitioner Ligia Peralta and all elements of the case discussed with attending  Dr. Peña

## 2018-02-16 NOTE — P.PN
<MookieTianaCarmelina M - Last Filed: 02/16/18 11:33>





Subjective


Progress Note Date: 02/16/18





33-year-old female seen and examined.  Patient is orally intubated  on 

mechanical ventilation sedated    initially was admitted to the hospital with 

right upper quadrant abdominal pain.  The CAT scan abdomen and pelvis showed 

evidence of calculus and pericholecystic fluid    underwent February 14 

laparoscopic cholecystectomy for acute gangrenous cholecystitis, cholelithiasis

    postoperatively the patient was extubated in the recovery room  needed  to 

be emergently reintubated due to an acute hypoxic respiratory failure.    

Patient has had a CAT scan of the chest done on the 15th showed no evidence of 

a pulmonary emboli correlate for bilateral lower lobe pneumonia    additionally 

patient had an echocardiogram done that showed no evidence of a shunt.   

Patient this morning did undergo a ALFIE with bubble study per cardiology which 

showed no evidence of shunt.    Patient remains on mechanical ventilation with 

PEEP of 12 tidal volume  450 heart rate in the 50s blood pressure 133/71





Objective





- Vital Signs


Vital signs: 


 Vital Signs











Temp  97.6 F   02/16/18 08:00


 


Pulse  63   02/16/18 10:30


 


Resp  23   02/16/18 10:30


 


BP  133/71   02/16/18 10:30


 


Pulse Ox  92 L  02/16/18 10:30








 Intake & Output











 02/15/18 02/16/18 02/16/18





 18:59 06:59 18:59


 


Intake Total 1400 1372.35 525


 


Output Total 3005 710 210


 


Balance -1605 662.35 315


 


Weight  127.6 kg 


 


Intake:   


 


  IV 1200 1075 425


 


    Lactated Ringers 1,000 ml 900 675 225





    @ 75 mls/hr IV .[id number]   





    [facility] Rx#:[id number]   


 


    Meropenem 1 gm In Sodium 200  100





    Chloride 0.9% 100 ml @   





    100 mls/hr IVPB Q8HR [facility]   





    Rx#:[id number]   


 


    Potassium Chloride 10 meq 100 400 100





    In Water For Injection 1   





    100ml.bag @ 100 mls/hr   





    IVPB Q1H [facility] Rx#:   





    [id number]   


 


  Intake, IV Titration 200 297.35 100





  Amount   


 


    Propofol 1,000 mg In 200 297.35 100





    Empty Bag 1 bag @ Titrate   





    IV .Q0M [facility] Rx#:   





    [id number]   


 


Output:   


 


  Drainage 200 50 


 


    Right Abdomen 200 50 


 


  Urine 2805 660 210


 


Other:   


 


  Voiding Method Indwelling Catheter Indwelling Catheter 














- Exam





Physical exam


63-year-old female currently intubated sedated on  mechanical ventilation


Lungs diminished at the bases otherwise adequate air movement no wheezing rail 

rhonchi noted


Abdomen soft nondistended surgical incision sites dry no redness AIDAN drain in 

place right lower quadrant serous drainage noted in the bulb few hypoactive 

bowel tones indwelling Pulido catheter in place adequate urine output


Extremities Venodyne's on bilateral lower extremities





- Labs


CBC & Chem 7: 


 02/16/18 04:47





 02/16/18 04:47


Labs: 


 Abnormal Lab Results - Last 24 Hours (Table)











  02/15/18 02/15/18 02/15/18 Range/Units





  15:15 19:59 21:43 


 


MCHC     (31.0-37.0)  g/dL


 


ABG pCO2  46 H   50 H  (35-45)  mmHg


 


ABG pO2    67 L  ()  mmHg


 


ABG HCO3  30 H   29 H  (21-25)  mmol/L


 


ABG Total CO2  31 H   31 H  (19-24)  mmol/L


 


ABG O2 Saturation    92.5 L  (94-97)  %


 


BUN     (7-17)  mg/dL


 


Glucose     (74-99)  mg/dL


 


POC Glucose (mg/dL)   160 H   (75-99)  mg/dL














  02/15/18 02/16/18 02/16/18 Range/Units





  23:52 04:47 04:47 


 


MCHC   30.7 L   (31.0-37.0)  g/dL


 


ABG pCO2     (35-45)  mmHg


 


ABG pO2     ()  mmHg


 


ABG HCO3     (21-25)  mmol/L


 


ABG Total CO2     (19-24)  mmol/L


 


ABG O2 Saturation     (94-97)  %


 


BUN    25 H  (7-17)  mg/dL


 


Glucose    165 H  (74-99)  mg/dL


 


POC Glucose (mg/dL)  184 H    (75-99)  mg/dL














  02/16/18 02/16/18 Range/Units





  06:14 07:37 


 


MCHC    (31.0-37.0)  g/dL


 


ABG pCO2   48 H  (35-45)  mmHg


 


ABG pO2   65 L  ()  mmHg


 


ABG HCO3   31 H  (21-25)  mmol/L


 


ABG Total CO2   33 H  (19-24)  mmol/L


 


ABG O2 Saturation   93.1 L  (94-97)  %


 


BUN    (7-17)  mg/dL


 


Glucose    (74-99)  mg/dL


 


POC Glucose (mg/dL)  161 H   (75-99)  mg/dL














Assessment and Plan


Assessment: 





Impression


Present on admission 3 day duration of right upper quadrant pain suspect due to 

an acute cholecystitis


CAT scan of the abdomen pelvis on admission showed evidence of cholelithiasis


Postop laparoscopic cholecystectomy due to an acute gangrenous cholecystitis 

done on February 14


Acute hypoxic respiratory failure unclear etiology needs to be urgently 

reintubated postoperatively


Morbid obesity BMI 44


Echocardiogram done February 15 no evidence of shunt no pulmonary hypertension 

left ventricular systolic function normal EF between 55 and 60%


Status post ALFIE with bubble study done on February 16 no evidence of a shunt





Plan


Continue with the recommendations by the intensivist for management of the ICU


Pulmonary management per the intensivist


Continue recommendations by infectious disease


DVT and GI prophylaxis


Further surgical recommendations pending








Progress note dictated for Dr. Rob rounding on behalf of dr gilbert





The above impression and plan of care have been discussed and directed by 

signing physician. Carmelina Damico nurse practitioner acting as scribe for signing 

physician.





<Kelli Rob N - Last Filed: 02/20/18 04:46>





Objective





- Vital Signs


Vital signs: 


 Vital Signs











Temp  97.2 F L  02/19/18 23:00


 


Pulse  54 L  02/19/18 23:00


 


Resp  16   02/19/18 23:00


 


BP  122/70   02/19/18 23:00


 


Pulse Ox  96   02/19/18 23:00








 Intake & Output











 02/19/18 02/19/18 02/20/18





 06:59 18:59 06:59


 


Intake Total 875 1700 75


 


Output Total 845 1615 80


 


Balance 30 85 -5


 


Weight 133 kg 133 kg 


 


Intake:   


 


   1000 75


 


    Lactated Ringers 1,000 ml 675 900 75





    @ 75 mls/hr IV .[id number]   





    [facility] Rx#:[id number]   


 


    Meropenem 1 gm In Sodium 200 100 





    Chloride 0.9% 100 ml @   





    100 mls/hr IVPB Q8HR [facility]   





    Rx#:[id number]   


 


  Oral  700 


 


Output:   


 


  Drainage 70 50 80


 


    Right Abdomen 70 50 80


 


  Urine 775 1565 


 


Other:   


 


  Voiding Method Indwelling Catheter Indwelling Catheter Bedside Commode





   Bedpan


 


  # Voids 1  3


 


  # Bowel Movements  2 














- Labs


CBC & Chem 7: 


 02/18/18 04:57





 02/19/18 03:55


Labs: 


 Abnormal Lab Results - Last 24 Hours (Table)











  02/19/18 02/19/18 02/19/18 Range/Units





  03:55 07:10 12:15 


 


Carbon Dioxide  34 H    (22-30)  mmol/L


 


BUN  34 H    (7-17)  mg/dL


 


Glucose  149 H    (74-99)  mg/dL


 


POC Glucose (mg/dL)   138 H  155 H  (75-99)  mg/dL


 


Magnesium  2.4 H    (1.6-2.3)  mg/dL


 


AST  51 H    (14-36)  U/L


 


ALT  93 H    (9-52)  U/L


 


Total Protein  5.8 L    (6.3-8.2)  g/dL


 


Albumin  3.1 L    (3.5-5.0)  g/dL














  02/19/18 02/19/18 Range/Units





  16:54 20:29 


 


Carbon Dioxide    (22-30)  mmol/L


 


BUN    (7-17)  mg/dL


 


Glucose    (74-99)  mg/dL


 


POC Glucose (mg/dL)  186 H  161 H  (75-99)  mg/dL


 


Magnesium    (1.6-2.3)  mg/dL


 


AST    (14-36)  U/L


 


ALT    (9-52)  U/L


 


Total Protein    (6.3-8.2)  g/dL


 


Albumin    (3.5-5.0)  g/dL














Assessment and Plan


(1) Acute cholecystitis


Current Visit: Yes   Status: Acute   Code(s): K81.0 - ACUTE CHOLECYSTITIS   

SNOMED Code(s): 01289184


   





(2) Acute hypoxemic respiratory failure


Current Visit: Yes   Status: Acute   Code(s): J96.01 - ACUTE RESPIRATORY 

FAILURE WITH HYPOXIA   SNOMED Code(s): 035180227


   





(3) Hypoxemic respiratory failure, chronic


Current Visit: Yes   Status: Acute   Code(s): J96.11 - CHRONIC RESPIRATORY 

FAILURE WITH HYPOXIA   SNOMED Code(s): 061113668


   





(4) Morbid obesity with BMI of 45.0-49.9, adult


Current Visit: Yes   Status: Acute   Code(s): E66.01 - MORBID (SEVERE) OBESITY 

DUE TO EXCESS CALORIES; Z68.42 - BODY MASS INDEX (BMI) 45.0-49.9, ADULT   

SNOMED Code(s): 421537607


   





(5) Status post cholecystectomy


Current Visit: Yes   Status: Acute   Code(s): Z90.49 - ACQUIRED ABSENCE OF 

OTHER SPECIFIED PARTS OF DIGESTIVE TRACT   SNOMED Code(s): 912678856


   





(6) Transaminitis


Current Visit: Yes   Status: Acute   Code(s): R74.0 - NONSPEC ELEV OF LEVELS OF 

TRANSAMNS & LACTIC ACID DEHYDRGNSE   SNOMED Code(s): 665714296

## 2018-02-16 NOTE — XR
EXAMINATION TYPE: XR chest 1V portable

 

DATE OF EXAM: 2/16/2018

 

COMPARISON: Prior chest x-ray 2/15/2018

 

HISTORY: Intubated

 

TECHNIQUE: Single frontal view of the chest is obtained.

 

FINDINGS:  Endotracheal tube and NG tube are overlying appropriate positions. Patient is rotated. Stella
pect the heart is enlarged. Basilar patchy increased density persists. No evident pneumothorax. Pulmo
nary vascularity and evelyn are stable.

 

IMPRESSION:  Findings are similar to prior exam. Probable basilar atelectasis versus pneumonia, cardi
omegaly, correlate for pulmonary artery hypertension

## 2018-02-16 NOTE — P.TEE
Indications for Procedure(s): 


Rule out Cardec shunt.  Difficulty oxygenating the patient


Date of Procedure: 02/23/18


Preoperative Diagnosis: 


Respiratory failure and inability to oxygenate patient adequately.  Suspected 

shunt


Postoperative Diagnosis: 


No evidence of intracardiac shunt


Procedure(s) Performed: 


ALFIE with the saline bubble injection


Description of Procedure(s): 





INDICATION: Rule out intracardiac shunt





CONSENT:.  Verbal consent is obtained from the family member.  Patient is 

intubated and sedated





PROCEDURE:.  Patient was already sedated.  Patient was given 1 mg of Versed.  A 

lubricated Omni probe was introduced in the oropharynx and was advanced to the 

esophagus.  Patient tolerated the procedure well.  Multiple views were 

obtained.  Color and pulse flow Dopplers were obtained.  Saline contrast 

bubbles were injected





FINDINGS: The aortic valve appeared to be normal.  Mitral valve appeared to be 

normal with mild regurgitation.  Left atrial appendage is free of any clot.  

Interatrial septum appeared to be intact without any spontaneous shunt.  

Injection of the contrast saline bubbles did not reveal any crossing of the 

bubbles across the septum.  Left and Function appear to be normal.





IMPRESSION: #1, normal valvular function.  #2.  Normal LV function #3.  No 

evidence of intracardiac shunt





PLAN: Continue current medical therapy.  Look for other reasons for 

hypooxygenation.

## 2018-02-16 NOTE — PN
PROGRESS NOTE



DATE OF SERVICE:

02/16/2018



ATTENDING NOTE:

This patient was seen and examined by me. I discussed the case with the nurse

practitioner MsVilma Hernandobeth.



This is a patient with gangrenous cholecystitis, now has a AIDAN drain in place; put out

about 100 mL from the drain last shift. Sanguineous secretions in the tracheostomy

tube. Remains on the ventilator.  Patient is also having bradycardia.



On examination, afebrile. Pulse 47, respiration 23, blood pressure 96/58, pulse ox 92%.

Lying in bed, intubated.

LUNGS:  Decreased breath sounds.

CARDIOVASCULAR: First and second sounds normal.



INVESTIGATIONS: Blood gas showed a pH of 7.43, pCO2 of 48, bicarb of 31, oxygen

saturation of 93.1%. FiO2 is 60%.  White count 9.5, hemoglobin 11.8. Accu-Cheks noted.



ASSESSMENT:

1. Acute gangrenous cholecystitis followed by laparoscopic cholecystectomy, now with a

    AIDAN drain in place, causing sepsis on presentation.

2. Acute hypoxic respiratory failure.  Shunt appears to be unlikely, given the bubble

    study is negative. ALFIE was negative. Probably this is from advanced COPD with a

    poor diffusion capacity, currently requiring mechanical support.

3. Hepatic steatosis.

4. Morbid obesity. BMI 40.7.

5. Advanced chronic obstructive pulmonary disease in an ex-smoker.

6. Hyperlipidemia.

7. Essential hypertension, history of.

8. Scoliosis.

9. Bradycardia; at this point cause unknown.



PLAN:

The patient is on bronchodilators, Norvasc, p.o. Lasix, Atrovent, LR, IV meropenem, IV

Solu-Medrol, IV Protonix, Pravachol, propofol.  Patient is slow to respond.  Will

follow.





MMODL / IJN: [id number] / Job#: [id number]

## 2018-02-16 NOTE — P.PN
Subjective


Progress Note Date: 02/16/18


Principal diagnosis: 





Post cholecystectomy hypoxic respiratory failure, unexpected.  Requiring 

mechanical ventilation.





This is a 63-year-old female, known history of COPD, obesity, hypertension, 

hyperlipidemia, history of cardiac arrhythmia in the form of atrial 

fibrillation requiring ablation, patient presented to the ER yesterday with 3 

days history of right upper quadrant pain.  CT of the abdomen and pelvis showed 

evidence of cholelithiasis and pericholecystic fluid.  Patient was seen by 

surgery on consultation, and she underwent laparoscopic cholecystectomy last 

night.  Post operatively, patient was extubated in the recovery room, however 

she had to be reintubated because of hypoxic respiratory failure.  Patient was 

sent to the ICU on mechanical ventilation, and I saw this morning she was still 

on relatively high FiO2 80%, PEEP of 5, and her ABG was rather marginal.  Her 

pO2 was 77 pCO2 was 49 pH of 7.39.  Initial ABG post intubation showed a pO2 of 

99 pCO2 of 51 and pH of 7.34.  The chest x-ray showed minimal bibasilar 

atelectasis, not severe enough to explain the degree of hypoxemia noted on the 

ABG.  Her O2 saturation on 80% was 94%, and as I went down to extubate percent, 

her O2 saturation remained basically about the same, 92%.  Considering that no 

change with dropping down the FiO2, I felt the patient may have to be evaluated 

for possible intracardiac right-to-left shunt.  Hence I recommended an urgent 

echocardiogram/contrast study to rule out an intracardiac shunt.  The patient 

is presently sedated, and not much history could be obtained from the patient.  

Chest x-ray and labs as well as medications were all reviewed.





Reevaluated today on 2/16/2018, patient remains on mechanical ventilation, 

sedated, on propofol.  Continues on relatively high FiO2 of 60%, PEEP is now up 

to 12, continues to have marginal saturations, and actually I'm not noticing 

much change in her oxygenation whether she is on 60% or 100%.  Hence I'm still 

suspecting that the patient has an intracardiac shunt.  Her echocardiogram was 

nondiagnostic, I discussed her condition with the cardiologist, and he may 

proceed with ALFIE to assess for possible shunt.  Her chest x-ray and CT of the 

chest are not impressive, no findings were noted to explain the profound degree 

of relative hypoxemia noted on the ABG.  Her ABG this morning on 60% and PEEP 

of 10 showed a pO2 of 65 pCO2 of 48 pH of 7.43.  CBC is relatively normal, 

basic metabolic profile is normal.  Liver enzymes are slightly elevated.  Her 

vent settings are tidal volume of 450, assist control rate of 24, FiO2 is 60%, 

PEEP is presently at 12.  Not much change was noted by increasing PEEP.  As a 

matter of fact not much changed noted with her oxygenation even with increasing 

her FiO2.  I plan to try the patient on 50% and see if there is any significant 

change.











Objective





- Vital Signs


Vital signs: 


 Vital Signs











Temp  97.6 F   02/16/18 08:00


 


Pulse  63   02/16/18 10:30


 


Resp  23   02/16/18 10:30


 


BP  133/71   02/16/18 10:30


 


Pulse Ox  92 L  02/16/18 10:30








 Intake & Output











 02/15/18 02/16/18 02/16/18





 18:59 06:59 18:59


 


Intake Total 1400 1372.35 525


 


Output Total 3005 710 210


 


Balance -1605 662.35 315


 


Weight  127.6 kg 


 


Intake:   


 


  IV 1200 1075 425


 


    Lactated Ringers 1,000 ml 900 675 225





    @ 75 mls/hr IV .[id number]   





    [facility] Rx#:[id number]   


 


    Meropenem 1 gm In Sodium 200  100





    Chloride 0.9% 100 ml @   





    100 mls/hr IVPB Q8HR [facility]   





    Rx#:[id number]   


 


    Potassium Chloride 10 meq 100 400 100





    In Water For Injection 1   





    100ml.bag @ 100 mls/hr   





    IVPB Q1H [facility] Rx#:   





    [id number]   


 


  Intake, IV Titration 200 297.35 100





  Amount   


 


    Propofol 1,000 mg In 200 297.35 100





    Empty Bag 1 bag @ Titrate   





    IV .Q0M [facility] Rx#:   





    [id number]   


 


Output:   


 


  Drainage 200 50 


 


    Right Abdomen 200 50 


 


  Urine 2805 660 210


 


Other:   


 


  Voiding Method Indwelling Catheter Indwelling Catheter 














- Exam





Physical Exam: Revealed a 63-year-old female, obese, on mechanical ventilation, 

has a size 7.0 endotracheal tube in place.


HEENT:[Neck is supple.] [No neck masses.] [No thyromegaly.] [No JVD.]  Moist 

mucous membranes.


Chest: Diminished breath sounds at the bases., no crackles, no rhonchi, no 

wheezes.]


Cardiac Exam: [Normal S1 and S2, no S3 gallop, no murmur.]


Abdomen: [Obese, postsurgical Soft, nontender,  no megaly, no rebound, no 

guarding, diminished bowel sounds.]


Extremities: [No clubbing, no edema, no cyanosis.]


Neurological Exam: [Cannot be assessed, patient is fully sedated on propofol.


Psychiatric: Cannot be assessed patient is sedated


Lymphatics: No lymphadenopathy was appreciated.











- Labs


CBC & Chem 7: 


 02/16/18 04:47





 02/16/18 04:47


Labs: 


 Abnormal Lab Results - Last 24 Hours (Table)











  02/15/18 02/15/18 02/15/18 Range/Units





  15:15 19:59 21:43 


 


MCHC     (31.0-37.0)  g/dL


 


ABG pCO2  46 H   50 H  (35-45)  mmHg


 


ABG pO2    67 L  ()  mmHg


 


ABG HCO3  30 H   29 H  (21-25)  mmol/L


 


ABG Total CO2  31 H   31 H  (19-24)  mmol/L


 


ABG O2 Saturation    92.5 L  (94-97)  %


 


BUN     (7-17)  mg/dL


 


Glucose     (74-99)  mg/dL


 


POC Glucose (mg/dL)   160 H   (75-99)  mg/dL














  02/15/18 02/16/18 02/16/18 Range/Units





  23:52 04:47 04:47 


 


MCHC   30.7 L   (31.0-37.0)  g/dL


 


ABG pCO2     (35-45)  mmHg


 


ABG pO2     ()  mmHg


 


ABG HCO3     (21-25)  mmol/L


 


ABG Total CO2     (19-24)  mmol/L


 


ABG O2 Saturation     (94-97)  %


 


BUN    25 H  (7-17)  mg/dL


 


Glucose    165 H  (74-99)  mg/dL


 


POC Glucose (mg/dL)  184 H    (75-99)  mg/dL














  02/16/18 02/16/18 Range/Units





  06:14 07:37 


 


MCHC    (31.0-37.0)  g/dL


 


ABG pCO2   48 H  (35-45)  mmHg


 


ABG pO2   65 L  ()  mmHg


 


ABG HCO3   31 H  (21-25)  mmol/L


 


ABG Total CO2   33 H  (19-24)  mmol/L


 


ABG O2 Saturation   93.1 L  (94-97)  %


 


BUN    (7-17)  mg/dL


 


Glucose    (74-99)  mg/dL


 


POC Glucose (mg/dL)  161 H   (75-99)  mg/dL














Assessment and Plan


Assessment: 


Impression:





1 Postoperative hypoxic respiratory failure, unexpected, most likely related to 

bibasilar atelectasis, underlying COPD, and possible right to left shunt.  The 

possibility of ARDS is in the differential, it is not entirely ruled out, but 

felt to be less likely considering the chest x-ray and the CT of the chest did 

not show any interstitial infiltrates to consider ARDS.  Granted, I will go 

ahead and ventilate the patient as having ARDS.





2 status post laparoscopic cholecystectomy, postoperative day #2 Patient 

presented with acute gangrenous cholecystitis.





3 history of multiple comorbidities including obesity, benign essential 

hypertension, COPD, hypertriglyceridemia.





Recommendation: Continue present supportive care measures, continue mechanical 

ventilation, patient will be placed on a lower FiO2 presently at 60%, discussed 

with cardiology, felt that her transthoracic echocardiogram was suboptimal, 

considering a ALFIE at this point.  In the meantime I discussed her condition 

with her brother at bedside, and explained to him the issues that we are 

encountering, and at this point I would not proceed with weaning trials.  Until 

her oxygenation improves a bit more.  Critical care time is 45 minutes.


Time with Patient: Greater than 30

## 2018-02-17 LAB
ALBUMIN SERPL-MCNC: 2.9 G/DL (ref 3.5–5)
ALP SERPL-CCNC: 115 U/L (ref 38–126)
ALT SERPL-CCNC: 122 U/L (ref 9–52)
ANION GAP SERPL CALC-SCNC: 9 MMOL/L
AST SERPL-CCNC: 60 U/L (ref 14–36)
BASOPHILS # BLD AUTO: 0 K/UL (ref 0–0.2)
BASOPHILS NFR BLD AUTO: 0 %
BUN SERPL-SCNC: 31 MG/DL (ref 7–17)
CALCIUM SPEC-MCNC: 9.4 MG/DL (ref 8.4–10.2)
CHLORIDE SERPL-SCNC: 103 MMOL/L (ref 98–107)
CO2 BLDA-SCNC: 35 MMOL/L (ref 19–24)
CO2 BLDA-SCNC: 36 MMOL/L (ref 19–24)
CO2 SERPL-SCNC: 31 MMOL/L (ref 22–30)
EOSINOPHIL # BLD AUTO: 0 K/UL (ref 0–0.7)
EOSINOPHIL NFR BLD AUTO: 0 %
ERYTHROCYTE [DISTWIDTH] IN BLOOD BY AUTOMATED COUNT: 4.47 M/UL (ref 3.8–5.4)
ERYTHROCYTE [DISTWIDTH] IN BLOOD: 13.8 % (ref 11.5–15.5)
GLUCOSE BLD-MCNC: 146 MG/DL (ref 75–99)
GLUCOSE BLD-MCNC: 147 MG/DL (ref 75–99)
GLUCOSE BLD-MCNC: 159 MG/DL (ref 75–99)
GLUCOSE BLD-MCNC: 183 MG/DL (ref 75–99)
GLUCOSE SERPL-MCNC: 161 MG/DL (ref 74–99)
HCO3 BLDA-SCNC: 33 MMOL/L (ref 21–25)
HCO3 BLDA-SCNC: 34 MMOL/L (ref 21–25)
HCT VFR BLD AUTO: 39.5 % (ref 34–46)
HGB BLD-MCNC: 12.1 GM/DL (ref 11.4–16)
LYMPHOCYTES # SPEC AUTO: 1 K/UL (ref 1–4.8)
LYMPHOCYTES NFR SPEC AUTO: 7 %
MAGNESIUM SPEC-SCNC: 2.5 MG/DL (ref 1.6–2.3)
MCH RBC QN AUTO: 27.1 PG (ref 25–35)
MCHC RBC AUTO-ENTMCNC: 30.7 G/DL (ref 31–37)
MCV RBC AUTO: 88.2 FL (ref 80–100)
MONOCYTES # BLD AUTO: 0.7 K/UL (ref 0–1)
MONOCYTES NFR BLD AUTO: 5 %
NEUTROPHILS # BLD AUTO: 12.8 K/UL (ref 1.3–7.7)
NEUTROPHILS NFR BLD AUTO: 87 %
PCO2 BLDA: 46 MMHG (ref 35–45)
PCO2 BLDA: 47 MMHG (ref 35–45)
PH BLDA: 7.47 [PH] (ref 7.35–7.45)
PH BLDA: 7.47 [PH] (ref 7.35–7.45)
PLATELET # BLD AUTO: 295 K/UL (ref 150–450)
PO2 BLDA: 67 MMHG (ref 83–108)
PO2 BLDA: 93 MMHG (ref 83–108)
POTASSIUM SERPL-SCNC: 4.5 MMOL/L (ref 3.5–5.1)
PROT SERPL-MCNC: 5.6 G/DL (ref 6.3–8.2)
SODIUM SERPL-SCNC: 143 MMOL/L (ref 137–145)
WBC # BLD AUTO: 14.7 K/UL (ref 3.8–10.6)

## 2018-02-17 RX ADMIN — INSULIN ASPART SCH UNIT: 100 INJECTION, SOLUTION INTRAVENOUS; SUBCUTANEOUS at 17:33

## 2018-02-17 RX ADMIN — IPRATROPIUM BROMIDE SCH MG: 0.5 SOLUTION RESPIRATORY (INHALATION) at 08:42

## 2018-02-17 RX ADMIN — DOCUSATE SODIUM SCH MG: 100 CAPSULE, LIQUID FILLED ORAL at 20:19

## 2018-02-17 RX ADMIN — DOCUSATE SODIUM SCH: 100 CAPSULE, LIQUID FILLED ORAL at 00:25

## 2018-02-17 RX ADMIN — INSULIN ASPART SCH UNIT: 100 INJECTION, SOLUTION INTRAVENOUS; SUBCUTANEOUS at 00:30

## 2018-02-17 RX ADMIN — METHYLPREDNISOLONE SODIUM SUCCINATE SCH MG: 40 INJECTION, POWDER, FOR SOLUTION INTRAMUSCULAR; INTRAVENOUS at 17:33

## 2018-02-17 RX ADMIN — IPRATROPIUM BROMIDE SCH MG: 0.5 SOLUTION RESPIRATORY (INHALATION) at 19:58

## 2018-02-17 RX ADMIN — Medication SCH: at 17:32

## 2018-02-17 RX ADMIN — METHYLPREDNISOLONE SODIUM SUCCINATE SCH MG: 40 INJECTION, POWDER, FOR SOLUTION INTRAMUSCULAR; INTRAVENOUS at 12:30

## 2018-02-17 RX ADMIN — FENOFIBRATE SCH MG: 160 TABLET ORAL at 08:35

## 2018-02-17 RX ADMIN — MEROPENEM SCH MLS/HR: 1 INJECTION, POWDER, FOR SOLUTION INTRAVENOUS at 17:34

## 2018-02-17 RX ADMIN — ENOXAPARIN SODIUM SCH MG: 40 INJECTION SUBCUTANEOUS at 08:35

## 2018-02-17 RX ADMIN — INSULIN ASPART SCH UNIT: 100 INJECTION, SOLUTION INTRAVENOUS; SUBCUTANEOUS at 06:17

## 2018-02-17 RX ADMIN — FUROSEMIDE SCH MG: 40 TABLET ORAL at 08:35

## 2018-02-17 RX ADMIN — INSULIN ASPART SCH UNIT: 100 INJECTION, SOLUTION INTRAVENOUS; SUBCUTANEOUS at 18:16

## 2018-02-17 RX ADMIN — POTASSIUM CHLORIDE SCH MLS/HR: 14.9 INJECTION, SOLUTION INTRAVENOUS at 20:19

## 2018-02-17 RX ADMIN — CHLORHEXIDINE GLUCONATE SCH: 1.2 RINSE ORAL at 20:24

## 2018-02-17 RX ADMIN — PROPOFOL SCH MLS/HR: 10 INJECTION, EMULSION INTRAVENOUS at 08:34

## 2018-02-17 RX ADMIN — DOCUSATE SODIUM SCH: 100 CAPSULE, LIQUID FILLED ORAL at 08:35

## 2018-02-17 RX ADMIN — PROPOFOL SCH MLS/HR: 10 INJECTION, EMULSION INTRAVENOUS at 00:42

## 2018-02-17 RX ADMIN — METHYLPREDNISOLONE SODIUM SUCCINATE SCH MG: 40 INJECTION, POWDER, FOR SOLUTION INTRAMUSCULAR; INTRAVENOUS at 06:17

## 2018-02-17 RX ADMIN — CHLORHEXIDINE GLUCONATE SCH ML: 1.2 RINSE ORAL at 08:35

## 2018-02-17 RX ADMIN — MEROPENEM SCH MLS/HR: 1 INJECTION, POWDER, FOR SOLUTION INTRAVENOUS at 00:31

## 2018-02-17 RX ADMIN — PANTOPRAZOLE SODIUM SCH MG: 40 INJECTION, POWDER, FOR SOLUTION INTRAVENOUS at 08:36

## 2018-02-17 RX ADMIN — PRAVASTATIN SODIUM SCH MG: 20 TABLET ORAL at 08:36

## 2018-02-17 RX ADMIN — PROPOFOL SCH MLS/HR: 10 INJECTION, EMULSION INTRAVENOUS at 04:06

## 2018-02-17 RX ADMIN — IPRATROPIUM BROMIDE SCH MG: 0.5 SOLUTION RESPIRATORY (INHALATION) at 15:55

## 2018-02-17 RX ADMIN — METHYLPREDNISOLONE SODIUM SUCCINATE SCH MG: 40 INJECTION, POWDER, FOR SOLUTION INTRAMUSCULAR; INTRAVENOUS at 00:30

## 2018-02-17 RX ADMIN — ONDANSETRON PRN MG: 2 INJECTION INTRAMUSCULAR; INTRAVENOUS at 21:17

## 2018-02-17 RX ADMIN — MEROPENEM SCH MLS/HR: 1 INJECTION, POWDER, FOR SOLUTION INTRAVENOUS at 08:34

## 2018-02-17 RX ADMIN — IPRATROPIUM BROMIDE SCH MG: 0.5 SOLUTION RESPIRATORY (INHALATION) at 11:12

## 2018-02-17 NOTE — P.PN
Subjective


Progress Note Date: 02/17/18


Principal diagnosis: 





Post cholecystectomy hypoxic respiratory failure, unexpected.  Requiring 

mechanical ventilation.





This is a 63-year-old female, known history of COPD, obesity, hypertension, 

hyperlipidemia, history of cardiac arrhythmia in the form of atrial 

fibrillation requiring ablation, patient presented to the ER yesterday with 3 

days history of right upper quadrant pain.  CT of the abdomen and pelvis showed 

evidence of cholelithiasis and pericholecystic fluid.  Patient was seen by 

surgery on consultation, and she underwent laparoscopic cholecystectomy last 

night.  Post operatively, patient was extubated in the recovery room, however 

she had to be reintubated because of hypoxic respiratory failure.  Patient was 

sent to the ICU on mechanical ventilation, and I saw this morning she was still 

on relatively high FiO2 80%, PEEP of 5, and her ABG was rather marginal.  Her 

pO2 was 77 pCO2 was 49 pH of 7.39.  Initial ABG post intubation showed a pO2 of 

99 pCO2 of 51 and pH of 7.34.  The chest x-ray showed minimal bibasilar 

atelectasis, not severe enough to explain the degree of hypoxemia noted on the 

ABG.  Her O2 saturation on 80% was 94%, and as I went down to extubate percent, 

her O2 saturation remained basically about the same, 92%.  Considering that no 

change with dropping down the FiO2, I felt the patient may have to be evaluated 

for possible intracardiac right-to-left shunt.  Hence I recommended an urgent 

echocardiogram/contrast study to rule out an intracardiac shunt.  The patient 

is presently sedated, and not much history could be obtained from the patient.  

Chest x-ray and labs as well as medications were all reviewed.





Reevaluated today on 2/16/2018, patient remains on mechanical ventilation, 

sedated, on propofol.  Continues on relatively high FiO2 of 60%, PEEP is now up 

to 12, continues to have marginal saturations, and actually I'm not noticing 

much change in her oxygenation whether she is on 60% or 100%.  Hence I'm still 

suspecting that the patient has an intracardiac shunt.  Her echocardiogram was 

nondiagnostic, I discussed her condition with the cardiologist, and he may 

proceed with ALFIE to assess for possible shunt.  Her chest x-ray and CT of the 

chest are not impressive, no findings were noted to explain the profound degree 

of relative hypoxemia noted on the ABG.  Her ABG this morning on 60% and PEEP 

of 10 showed a pO2 of 65 pCO2 of 48 pH of 7.43.  CBC is relatively normal, 

basic metabolic profile is normal.  Liver enzymes are slightly elevated.  Her 

vent settings are tidal volume of 450, assist control rate of 24, FiO2 is 60%, 

PEEP is presently at 12.  Not much change was noted by increasing PEEP.  As a 

matter of fact not much changed noted with her oxygenation even with increasing 

her FiO2.  I plan to try the patient on 50% and see if there is any significant 

change.





Patient was reevaluated today on 2/70/2018, remains on mechanical ventilation, 

remains on a PEEP of 12, FiO2 was 60% earlier, but even cutting it down to 50% 

did not make much of a difference.  Patient was switched to a pressure support 

and SIMV mode of mechanical ventilation, for about one hour, then she was 

placed on CPAP and pressure support mode of mechanical ventilation.  After 1 

hour, her ABG was done and showed a pO2 of 93 pCO2 of 47 pH of 7.47.  Patient 

was hypoventilating, but that did not reflect on her pCO2 based on the ABG.  

Hence I recommended extubating the patient to BiPAP, and this was already at 

bedside.  Her chest x-ray is showing improvement.  Her ABG seems to be better 

overall, however I still believe the patient should be extubated to BiPAP 

because of the hypoventilatory status.  For her metabolic alkalosis, I believe 

it is mostly a contraction alkalosis, has the patient will be given some fluids.











Objective





- Vital Signs


Vital signs: 


 Vital Signs











Temp  97.5 F L  02/17/18 08:00


 


Pulse  67   02/17/18 11:22


 


Resp  21   02/17/18 10:00


 


BP  124/78   02/17/18 10:00


 


Pulse Ox  95   02/17/18 10:00








 Intake & Output











 02/16/18 02/17/18 02/17/18





 18:59 06:59 18:59


 


Intake Total 0592.681 8137.208 554.315


 


Output Total 925 710 385


 


Balance 598.297 603.208 169.315


 


Weight 127.6 kg 127.7 kg 127.7 kg


 


Intake:   


 


  IV 1245 95 100


 


    0.9 120 20 


 


    Lactated Ringers 1,000 ml 825 75 100





    @ 75 mls/hr IV .[id number]   





    [facility] Rx#:[id number]   


 


    Meropenem 1 gm In Sodium 200  





    Chloride 0.9% 100 ml @   





    100 mls/hr IVPB Q8HR [facility]   





    Rx#:[id number]   


 


    Potassium Chloride 10 meq 100  





    In Water For Injection 1   





    100ml.bag @ 100 mls/hr   





    IVPB Q1H [facility] Rx#:   





    [id number]   


 


  Intake, IV Titration 824.657 4669.208 394.315





  Amount   


 


    Lactated Ringers 1,000 ml  825 265





    @ 75 mls/hr IV .[id number]   





    [facility] Rx#:[id number]   


 


    Meropenem 1 gm In Sodium  100 





    Chloride 0.9% 100 ml @   





    100 mls/hr IVPB Q8HR [facility]   





    Rx#:[id number]   


 


    Propofol 1,000 mg In 278.297 293.208 129.315





    Empty Bag 1 bag @ Titrate   





    IV .Q0M [facility] Rx#:   





    [id number]   


 


  Oral   60


 


Output:   


 


  Drainage 180 80 100


 


    Right Abdomen 180 80 100


 


  Urine 745 630 285


 


Other:   


 


  Voiding Method Indwelling Catheter Indwelling Catheter Indwelling Catheter


 


  # Voids  1 


 


  # Bowel Movements  1 1














- Exam





Physical Exam: Revealed a 63-year-old female, obese, on mechanical ventilation, 

has a size 7.0 endotracheal tube in place.


HEENT:[Neck is supple.] [No neck masses.] [No thyromegaly.] [No JVD.]  Moist 

mucous membranes.


Chest: Diminished breath sounds at the bases., no crackles, no rhonchi, no 

wheezes.]


Cardiac Exam: [Normal S1 and S2, no S3 gallop, no murmur.]


Abdomen: [Obese, postsurgical Soft, nontender,  no megaly, no rebound, no 

guarding, diminished bowel sounds.]


Extremities: [No clubbing, no edema, no cyanosis.]


Neurological Exam: [No gross focal neurologic deficit was noted when she was 

off sedation.


Psychiatric: Normal mood and affect, normal mental status examination noted.


Lymphatics: No lymphadenopathy was appreciated.











- Labs


CBC & Chem 7: 


 02/17/18 05:24





 02/17/18 05:24


Labs: 


 Abnormal Lab Results - Last 24 Hours (Table)











  02/15/18 02/16/18 02/17/18 Range/Units





  21:53 17:00 00:20 


 


WBC     (3.8-10.6)  k/uL


 


MCHC     (31.0-37.0)  g/dL


 


Neutrophils #     (1.3-7.7)  k/uL


 


ABG pH     (7.35-7.45)  


 


ABG pCO2     (35-45)  mmHg


 


ABG pO2     ()  mmHg


 


ABG HCO3     (21-25)  mmol/L


 


ABG Total CO2     (19-24)  mmol/L


 


ABG O2 Saturation     (94-97)  %


 


Carbon Dioxide     (22-30)  mmol/L


 


BUN     (7-17)  mg/dL


 


Glucose     (74-99)  mg/dL


 


POC Glucose (mg/dL)   152 H  183 H  (75-99)  mg/dL


 


Hemoglobin A1c  6.2 H    (4.0-6.0)  %


 


Magnesium     (1.6-2.3)  mg/dL


 


AST     (14-36)  U/L


 


ALT     (9-52)  U/L


 


Total Protein     (6.3-8.2)  g/dL


 


Albumin     (3.5-5.0)  g/dL














  02/17/18 02/17/18 02/17/18 Range/Units





  05:24 05:24 06:12 


 


WBC  14.7 H    (3.8-10.6)  k/uL


 


MCHC  30.7 L    (31.0-37.0)  g/dL


 


Neutrophils #  12.8 H    (1.3-7.7)  k/uL


 


ABG pH     (7.35-7.45)  


 


ABG pCO2     (35-45)  mmHg


 


ABG pO2     ()  mmHg


 


ABG HCO3     (21-25)  mmol/L


 


ABG Total CO2     (19-24)  mmol/L


 


ABG O2 Saturation     (94-97)  %


 


Carbon Dioxide   31 H   (22-30)  mmol/L


 


BUN   31 H   (7-17)  mg/dL


 


Glucose   161 H   (74-99)  mg/dL


 


POC Glucose (mg/dL)    159 H  (75-99)  mg/dL


 


Hemoglobin A1c     (4.0-6.0)  %


 


Magnesium   2.5 H   (1.6-2.3)  mg/dL


 


AST   60 H   (14-36)  U/L


 


ALT   122 H   (9-52)  U/L


 


Total Protein   5.6 L   (6.3-8.2)  g/dL


 


Albumin   2.9 L   (3.5-5.0)  g/dL














  02/17/18 02/17/18 Range/Units





  07:58 11:26 


 


WBC    (3.8-10.6)  k/uL


 


MCHC    (31.0-37.0)  g/dL


 


Neutrophils #    (1.3-7.7)  k/uL


 


ABG pH  7.47 H  7.47 H  (7.35-7.45)  


 


ABG pCO2  46 H  47 H  (35-45)  mmHg


 


ABG pO2  67 L   ()  mmHg


 


ABG HCO3  33 H  34 H  (21-25)  mmol/L


 


ABG Total CO2  35 H  36 H  (19-24)  mmol/L


 


ABG O2 Saturation  92.8 L  97.3 H  (94-97)  %


 


Carbon Dioxide    (22-30)  mmol/L


 


BUN    (7-17)  mg/dL


 


Glucose    (74-99)  mg/dL


 


POC Glucose (mg/dL)    (75-99)  mg/dL


 


Hemoglobin A1c    (4.0-6.0)  %


 


Magnesium    (1.6-2.3)  mg/dL


 


AST    (14-36)  U/L


 


ALT    (9-52)  U/L


 


Total Protein    (6.3-8.2)  g/dL


 


Albumin    (3.5-5.0)  g/dL














Assessment and Plan


Assessment: 


Impression:





1 Postoperative hypoxic respiratory failure, unexpected, most likely related to 

bibasilar atelectasis, underlying COPD, and possible right to left shunt.  The 

possibility of ARDS is in the differential, it is not entirely ruled out, but 

felt to be less likely considering the chest x-ray and the CT of the chest did 

not show any interstitial infiltrates to consider ARDS.  Granted, was 

ventilated with low tidal volumes, and high respiratory rate.  





2 status post laparoscopic cholecystectomy, postoperative day #3 Patient 

presented with acute gangrenous cholecystitis.





3 history of multiple comorbidities including obesity, benign essential 

hypertension, COPD, hypertriglyceridemia.





Recommendation: Continue present supportive care measures, I spent quite some 

time at the bedside, adjusting her mode of mechanical ventilations, and assess 

the patient closely with different changes on mechanical ventilation.  After an 

hour of pressure support and IMV as well as pressure support and CPAP of 

mechanical ventilation, ABG was checked, and proceeded to extubating the 

patient to BiPAP.  We'll continue incentive spirometry, continue bronchodilators

, hydrate cautiously because of the metabolic alkalosis as noted, and we'll 

continue to follow.  Patient will remain in the ICU today.  Critical care time 

is 1 hour.


Time with Patient: Greater than 30

## 2018-02-17 NOTE — XR
EXAMINATION TYPE: XR chest 1V portable

 

DATE OF EXAM: 2/17/2018

 

HISTORY: vent.

 

REFERENCE: Previous study dated 2/16/2018.

 

FINDINGS: The patient is ET tube and NG tube remain in place, unchanged in appearance.

 

The heart is mildly prominent. The lungs are clear. There is blunting of both CP angles.

 

IMPRESSION: 

1. CARDIOMEGALY.

2. I CANNOT EXCLUDE SMALL, BILATERAL EFFUSIONS.

## 2018-02-17 NOTE — PN
PROGRESS NOTE



This lady underwent a transesophageal echo performed by Dr. Ann yesterday. She

had significant hypoxia and a shunt was thought to be there, but  no shunt was noted on

the ALFIE echo.  Following cholecystitis, she became hypoxic.  At this time she is in

sinus rhythm, resting comfortably, doing well, oxygenating fairly well.  Vital signs

are stable. S1, S2 heard normally.  Lungs are clear.  Abdomen exam was deferred.  Rest

of physical examination unchanged.  I will see this patient as needed.  She remains in

a sinus rhythm, and if there is any question, we will be happy to re-evaluate her.





MMODL / IJN: [id number] / Job#: [id number]

## 2018-02-17 NOTE — PN
PROGRESS NOTE



DATE OF SERVICE:

02/17/2018



Patient is being followed for Dr. Peña.  Patient is a 63-year-old female patient,

status post cholecystectomy with acute hypoxemic respiratory failure requiring

mechanical ventilation.  As the patient is seen in ICU, the patient is switched to

pressure support on mechanical ventilation with a plan to put on CPAP.



VITAL SIGNS:  Temperature 97.5, pulse 67, respirations 21, blood pressure 124/78, O2

saturation 95%.

HEENT:  Atraumatic, normocephalic.  Pupils equal and reactive to light.  Extraocular

movements intact.  Buccal mucosa is fair.  Neck is supple.  No goiter, lymphadenopathy.

JVD is negative.  No carotid bruit heard.

LUNGS:  Decreased breath sounds.  Heart is regular rate and rhythm without any murmurs

or gallop rhythm.  Abdomen is soft, obese.  Bowel sounds positive.

EXTREMITIES:  No edema, clubbing, cyanosis.

NEUROLOGICAL:  Patient cannot be evaluated.



LABS:

CBC:  White blood count of 14.7, hemoglobin 12.1, hematocrit 39.7, platelet count of

295.  Chemical profile:  Sodium 143, potassium 4.5, chloride 103, bicarb 31, BUN 31,

creatinine 0.7, glucose 161.



ASSESSMENT:

1. Postoperative hypoxemic respiratory failure.

2. Status post laparoscopic cholecystectomy.

3. Laparoscopic cholecystectomy for acute gangrenous cholecystitis.  The patient has a

    Vaughn Khalil drain in place.

4. Hepatic steatosis.

5. Morbid obesity.

6. Chronic obstructive pulmonary disease.

7. Hyperlipidemia.

8. Hypertension.

9. Bradycardia.

The patient remains on IV Merrem, IV Solu-Medrol and Protonix with IV propofol for

sedation.  Pulmonary is following and plan is to start gradual weaning while keeping

patient well-hydrated.  Further recommendations depending on the clinical course of the

patient.





MMODL / IJN: [id number] / Job#: [id number]

## 2018-02-18 LAB
ALBUMIN SERPL-MCNC: 2.8 G/DL (ref 3.5–5)
ALP SERPL-CCNC: 93 U/L (ref 38–126)
ALT SERPL-CCNC: 99 U/L (ref 9–52)
ANION GAP SERPL CALC-SCNC: 8 MMOL/L
AST SERPL-CCNC: 51 U/L (ref 14–36)
BASOPHILS # BLD AUTO: 0.1 K/UL (ref 0–0.2)
BASOPHILS NFR BLD AUTO: 0 %
BUN SERPL-SCNC: 36 MG/DL (ref 7–17)
CALCIUM SPEC-MCNC: 8.9 MG/DL (ref 8.4–10.2)
CHLORIDE SERPL-SCNC: 103 MMOL/L (ref 98–107)
CO2 SERPL-SCNC: 35 MMOL/L (ref 22–30)
EOSINOPHIL # BLD AUTO: 0.1 K/UL (ref 0–0.7)
EOSINOPHIL NFR BLD AUTO: 1 %
ERYTHROCYTE [DISTWIDTH] IN BLOOD BY AUTOMATED COUNT: 4.66 M/UL (ref 3.8–5.4)
ERYTHROCYTE [DISTWIDTH] IN BLOOD: 13.9 % (ref 11.5–15.5)
GLUCOSE BLD-MCNC: 143 MG/DL (ref 75–99)
GLUCOSE BLD-MCNC: 148 MG/DL (ref 75–99)
GLUCOSE BLD-MCNC: 155 MG/DL (ref 75–99)
GLUCOSE BLD-MCNC: 163 MG/DL (ref 75–99)
GLUCOSE BLD-MCNC: 179 MG/DL (ref 75–99)
GLUCOSE SERPL-MCNC: 162 MG/DL (ref 74–99)
HCT VFR BLD AUTO: 41 % (ref 34–46)
HGB BLD-MCNC: 12.7 GM/DL (ref 11.4–16)
LYMPHOCYTES # SPEC AUTO: 0.7 K/UL (ref 1–4.8)
LYMPHOCYTES NFR SPEC AUTO: 5 %
MAGNESIUM SPEC-SCNC: 2.3 MG/DL (ref 1.6–2.3)
MCH RBC QN AUTO: 27.3 PG (ref 25–35)
MCHC RBC AUTO-ENTMCNC: 31.1 G/DL (ref 31–37)
MCV RBC AUTO: 87.8 FL (ref 80–100)
MONOCYTES # BLD AUTO: 0.6 K/UL (ref 0–1)
MONOCYTES NFR BLD AUTO: 4 %
NEUTROPHILS # BLD AUTO: 12.5 K/UL (ref 1.3–7.7)
NEUTROPHILS NFR BLD AUTO: 89 %
PLATELET # BLD AUTO: 333 K/UL (ref 150–450)
POTASSIUM SERPL-SCNC: 4.4 MMOL/L (ref 3.5–5.1)
PROT SERPL-MCNC: 5.4 G/DL (ref 6.3–8.2)
SODIUM SERPL-SCNC: 146 MMOL/L (ref 137–145)
WBC # BLD AUTO: 14 K/UL (ref 3.8–10.6)

## 2018-02-18 RX ADMIN — METHYLPREDNISOLONE SODIUM SUCCINATE SCH MG: 40 INJECTION, POWDER, FOR SOLUTION INTRAMUSCULAR; INTRAVENOUS at 17:00

## 2018-02-18 RX ADMIN — IPRATROPIUM BROMIDE SCH: 0.5 SOLUTION RESPIRATORY (INHALATION) at 15:33

## 2018-02-18 RX ADMIN — MEROPENEM SCH MLS/HR: 1 INJECTION, POWDER, FOR SOLUTION INTRAVENOUS at 15:28

## 2018-02-18 RX ADMIN — DOCUSATE SODIUM SCH: 100 CAPSULE, LIQUID FILLED ORAL at 08:29

## 2018-02-18 RX ADMIN — FENOFIBRATE SCH MG: 160 TABLET ORAL at 08:29

## 2018-02-18 RX ADMIN — MEROPENEM SCH MLS/HR: 1 INJECTION, POWDER, FOR SOLUTION INTRAVENOUS at 08:24

## 2018-02-18 RX ADMIN — IPRATROPIUM BROMIDE SCH MG: 0.5 SOLUTION RESPIRATORY (INHALATION) at 19:24

## 2018-02-18 RX ADMIN — METHYLPREDNISOLONE SODIUM SUCCINATE SCH MG: 40 INJECTION, POWDER, FOR SOLUTION INTRAMUSCULAR; INTRAVENOUS at 05:27

## 2018-02-18 RX ADMIN — CHLORHEXIDINE GLUCONATE SCH: 1.2 RINSE ORAL at 08:29

## 2018-02-18 RX ADMIN — PANTOPRAZOLE SODIUM SCH MG: 40 INJECTION, POWDER, FOR SOLUTION INTRAVENOUS at 08:29

## 2018-02-18 RX ADMIN — FUROSEMIDE SCH MG: 40 TABLET ORAL at 08:29

## 2018-02-18 RX ADMIN — DOCUSATE SODIUM SCH: 100 CAPSULE, LIQUID FILLED ORAL at 21:07

## 2018-02-18 RX ADMIN — ONDANSETRON PRN MG: 2 INJECTION INTRAMUSCULAR; INTRAVENOUS at 21:15

## 2018-02-18 RX ADMIN — INSULIN ASPART SCH UNIT: 100 INJECTION, SOLUTION INTRAVENOUS; SUBCUTANEOUS at 17:10

## 2018-02-18 RX ADMIN — INSULIN ASPART SCH UNIT: 100 INJECTION, SOLUTION INTRAVENOUS; SUBCUTANEOUS at 00:14

## 2018-02-18 RX ADMIN — ONDANSETRON PRN MG: 2 INJECTION INTRAMUSCULAR; INTRAVENOUS at 13:00

## 2018-02-18 RX ADMIN — ENOXAPARIN SODIUM SCH MG: 40 INJECTION SUBCUTANEOUS at 08:29

## 2018-02-18 RX ADMIN — Medication SCH: at 11:06

## 2018-02-18 RX ADMIN — MEROPENEM SCH MLS/HR: 1 INJECTION, POWDER, FOR SOLUTION INTRAVENOUS at 00:00

## 2018-02-18 RX ADMIN — INSULIN ASPART SCH UNIT: 100 INJECTION, SOLUTION INTRAVENOUS; SUBCUTANEOUS at 21:09

## 2018-02-18 RX ADMIN — MEROPENEM SCH MLS/HR: 1 INJECTION, POWDER, FOR SOLUTION INTRAVENOUS at 23:05

## 2018-02-18 RX ADMIN — POTASSIUM CHLORIDE SCH MLS/HR: 14.9 INJECTION, SOLUTION INTRAVENOUS at 21:06

## 2018-02-18 RX ADMIN — INSULIN ASPART SCH UNIT: 100 INJECTION, SOLUTION INTRAVENOUS; SUBCUTANEOUS at 11:10

## 2018-02-18 RX ADMIN — INSULIN ASPART SCH UNIT: 100 INJECTION, SOLUTION INTRAVENOUS; SUBCUTANEOUS at 08:24

## 2018-02-18 RX ADMIN — METHYLPREDNISOLONE SODIUM SUCCINATE SCH MG: 40 INJECTION, POWDER, FOR SOLUTION INTRAMUSCULAR; INTRAVENOUS at 23:05

## 2018-02-18 RX ADMIN — POTASSIUM CHLORIDE SCH: 14.9 INJECTION, SOLUTION INTRAVENOUS at 08:28

## 2018-02-18 RX ADMIN — ISODIUM CHLORIDE PRN MG: 0.03 SOLUTION RESPIRATORY (INHALATION) at 19:24

## 2018-02-18 RX ADMIN — IPRATROPIUM BROMIDE SCH MG: 0.5 SOLUTION RESPIRATORY (INHALATION) at 11:37

## 2018-02-18 RX ADMIN — METHYLPREDNISOLONE SODIUM SUCCINATE SCH MG: 40 INJECTION, POWDER, FOR SOLUTION INTRAMUSCULAR; INTRAVENOUS at 11:06

## 2018-02-18 RX ADMIN — ONDANSETRON PRN MG: 2 INJECTION INTRAMUSCULAR; INTRAVENOUS at 05:21

## 2018-02-18 RX ADMIN — METHYLPREDNISOLONE SODIUM SUCCINATE SCH MG: 40 INJECTION, POWDER, FOR SOLUTION INTRAMUSCULAR; INTRAVENOUS at 00:07

## 2018-02-18 RX ADMIN — PRAVASTATIN SODIUM SCH MG: 20 TABLET ORAL at 08:30

## 2018-02-18 RX ADMIN — IPRATROPIUM BROMIDE SCH MG: 0.5 SOLUTION RESPIRATORY (INHALATION) at 08:02

## 2018-02-18 NOTE — P.PN
Subjective


Progress Note Date: 02/18/18


Principal diagnosis: 





Post cholecystectomy hypoxic respiratory failure, unexpected.  Requiring 

mechanical ventilation.





This is a 63-year-old female, known history of COPD, obesity, hypertension, 

hyperlipidemia, history of cardiac arrhythmia in the form of atrial 

fibrillation requiring ablation, patient presented to the ER yesterday with 3 

days history of right upper quadrant pain.  CT of the abdomen and pelvis showed 

evidence of cholelithiasis and pericholecystic fluid.  Patient was seen by 

surgery on consultation, and she underwent laparoscopic cholecystectomy last 

night.  Post operatively, patient was extubated in the recovery room, however 

she had to be reintubated because of hypoxic respiratory failure.  Patient was 

sent to the ICU on mechanical ventilation, and I saw this morning she was still 

on relatively high FiO2 80%, PEEP of 5, and her ABG was rather marginal.  Her 

pO2 was 77 pCO2 was 49 pH of 7.39.  Initial ABG post intubation showed a pO2 of 

99 pCO2 of 51 and pH of 7.34.  The chest x-ray showed minimal bibasilar 

atelectasis, not severe enough to explain the degree of hypoxemia noted on the 

ABG.  Her O2 saturation on 80% was 94%, and as I went down to extubate percent, 

her O2 saturation remained basically about the same, 92%.  Considering that no 

change with dropping down the FiO2, I felt the patient may have to be evaluated 

for possible intracardiac right-to-left shunt.  Hence I recommended an urgent 

echocardiogram/contrast study to rule out an intracardiac shunt.  The patient 

is presently sedated, and not much history could be obtained from the patient.  

Chest x-ray and labs as well as medications were all reviewed.





Reevaluated today on 2/16/2018, patient remains on mechanical ventilation, 

sedated, on propofol.  Continues on relatively high FiO2 of 60%, PEEP is now up 

to 12, continues to have marginal saturations, and actually I'm not noticing 

much change in her oxygenation whether she is on 60% or 100%.  Hence I'm still 

suspecting that the patient has an intracardiac shunt.  Her echocardiogram was 

nondiagnostic, I discussed her condition with the cardiologist, and he may 

proceed with ALFIE to assess for possible shunt.  Her chest x-ray and CT of the 

chest are not impressive, no findings were noted to explain the profound degree 

of relative hypoxemia noted on the ABG.  Her ABG this morning on 60% and PEEP 

of 10 showed a pO2 of 65 pCO2 of 48 pH of 7.43.  CBC is relatively normal, 

basic metabolic profile is normal.  Liver enzymes are slightly elevated.  Her 

vent settings are tidal volume of 450, assist control rate of 24, FiO2 is 60%, 

PEEP is presently at 12.  Not much change was noted by increasing PEEP.  As a 

matter of fact not much changed noted with her oxygenation even with increasing 

her FiO2.  I plan to try the patient on 50% and see if there is any significant 

change.





Patient was reevaluated today on 2/17/2018, remains on mechanical ventilation, 

remains on a PEEP of 12, FiO2 was 60% earlier, but even cutting it down to 50% 

did not make much of a difference.  Patient was switched to a pressure support 

and SIMV mode of mechanical ventilation, for about one hour, then she was 

placed on CPAP and pressure support mode of mechanical ventilation.  After 1 

hour, her ABG was done and showed a pO2 of 93 pCO2 of 47 pH of 7.47.  Patient 

was hypoventilating, but that did not reflect on her pCO2 based on the ABG.  

Hence I recommended extubating the patient to BiPAP, and this was already at 

bedside.  Her chest x-ray is showing improvement.  Her ABG seems to be better 

overall, however I still believe the patient should be extubated to BiPAP 

because of the hypoventilatory status.  For her metabolic alkalosis, I believe 

it is mostly a contraction alkalosis, has the patient will be given some fluids.





Patient was reevaluated today on 2/18/2018, patient has been on nasal cannula 

since extubation yesterday, however she was noted to be slightly confused 

earlier today, and she was placed on BiPAP.  Saturating well on 40% FiO2 and 

BiPAP.  Patient is alert, oriented, does not seem to be in any form of 

distress.  Labs were reviewed including basic metabolic profile which is 

relatively normal, and CBC was normal.  Chest x-ray showed cardiomegaly and no 

evidence of any active disease.











Objective





- Vital Signs


Vital signs: 


 Vital Signs











Temp  97.5 F L  02/18/18 12:00


 


Pulse  57 L  02/18/18 15:00


 


Resp  25 H  02/18/18 15:00


 


BP  173/83   02/18/18 15:00


 


Pulse Ox  93 L  02/18/18 15:00








 Intake & Output











 02/17/18 02/18/18 02/18/18





 18:59 06:59 18:59


 


Intake Total 2104.315 1265 625


 


Output Total 1345 845 550


 


Balance 759.315 420 75


 


Weight 127.7 kg 130.6 kg 


 


Intake:   


 


  IV 1150 1025 625


 


    Lactated Ringers 1,000 ml 1050 1025 525





    @ 75 mls/hr IV .[id number]   





    [facility] Rx#:[id number]   


 


    Meropenem 1 gm In Sodium 100  100





    Chloride 0.9% 100 ml @   





    100 mls/hr IVPB Q8HR [facility]   





    Rx#:[id number]   


 


  Intake, IV Titration 394.315  





  Amount   


 


    Lactated Ringers 1,000 ml 265  





    @ 75 mls/hr IV .[id number]   





    [facility] Rx#:[id number]   


 


    Propofol 1,000 mg In 129.315  





    Empty Bag 1 bag @ Titrate   





    IV .Q0M [facility] Rx#:   





    [id number]   


 


  Oral 560 240 


 


Output:   


 


  Drainage 100 150 50


 


    Right Abdomen 100 150 50


 


  Urine 1245 695 500


 


Other:   


 


  Voiding Method Indwelling Catheter Indwelling Catheter Indwelling Catheter


 


  # Voids 1  


 


  # Bowel Movements 1 1 3


 


  # Emeses  1 














- Exam





Physical Exam: Revealed a 63-year-old female, obese, on BiPAP, in no distress..


HEENT:[Neck is supple.] [No neck masses.] [No thyromegaly.] [No JVD.]  Moist 

mucous membranes.


Chest: Diminished breath sounds at the bases., no crackles, no rhonchi, no 

wheezes.]


Cardiac Exam: [Normal S1 and S2, no S3 gallop, no murmur.]


Abdomen: [Obese, postsurgical Soft, nontender,  no megaly, no rebound, no 

guarding, diminished bowel sounds.]


Extremities: [No clubbing, no edema, no cyanosis.]


Neurological Exam: [No gross focal neurologic deficit was noted when she was 

off sedation.


Psychiatric: Normal mood and affect, normal mental status examination noted.


Lymphatics: No lymphadenopathy was appreciated.











- Labs


CBC & Chem 7: 


 02/18/18 04:57





 02/18/18 04:57


Labs: 


 Abnormal Lab Results - Last 24 Hours (Table)











  02/17/18 02/18/18 02/18/18 Range/Units





  18:16 00:08 04:57 


 


WBC    14.0 H  (3.8-10.6)  k/uL


 


Neutrophils #    12.5 H  (1.3-7.7)  k/uL


 


Lymphocytes #    0.7 L  (1.0-4.8)  k/uL


 


Sodium     (137-145)  mmol/L


 


Carbon Dioxide     (22-30)  mmol/L


 


BUN     (7-17)  mg/dL


 


Glucose     (74-99)  mg/dL


 


POC Glucose (mg/dL)  146 H  179 H   (75-99)  mg/dL


 


AST     (14-36)  U/L


 


ALT     (9-52)  U/L


 


Total Protein     (6.3-8.2)  g/dL


 


Albumin     (3.5-5.0)  g/dL














  02/18/18 02/18/18 02/18/18 Range/Units





  04:57 07:36 11:05 


 


WBC     (3.8-10.6)  k/uL


 


Neutrophils #     (1.3-7.7)  k/uL


 


Lymphocytes #     (1.0-4.8)  k/uL


 


Sodium  146 H    (137-145)  mmol/L


 


Carbon Dioxide  35 H    (22-30)  mmol/L


 


BUN  36 H    (7-17)  mg/dL


 


Glucose  162 H    (74-99)  mg/dL


 


POC Glucose (mg/dL)   163 H  155 H  (75-99)  mg/dL


 


AST  51 H    (14-36)  U/L


 


ALT  99 H    (9-52)  U/L


 


Total Protein  5.4 L    (6.3-8.2)  g/dL


 


Albumin  2.8 L    (3.5-5.0)  g/dL














Assessment and Plan


Assessment: 


Impression:





1 Postoperative hypoxic respiratory failure, unexpected, most likely related to 

bibasilar atelectasis, underlying COPD, and possible right to left shunt which 

was ruled out based on the echocardiogram studies done 2..  Patient was 

extubated yesterday, tolerated the extubation well, in the meantime we are 

continuing with bronchodilators, empiric antibiotics, and steroids.





3 history of multiple comorbidities including obesity, benign essential 

hypertension, COPD, hypertriglyceridemia.





Recommendation: Continue present supportive care measures, continue BiPAP, 

continue bronchodilators, continue O2, GI and DVT prophylaxis, we'll likely 

transfer out of the ICU in the next 24 hours.  Encourage incentive spirometry, 

early ambulation.


Time with Patient: Less than 30

## 2018-02-18 NOTE — P.PN
Subjective


Progress Note Date: 02/18/18





Patient is a 63-year-old female who has been extubated and doing remarkably 

well in the last 12-24 hours after having been intubated for unknown cause.  

Earlier today she troubles with apple juice and orange juice.  No reports of 

abdominal pain.  She is overall feels well rested.  Family is at bedside.





Objective





- Vital Signs


Vital signs: 


 Vital Signs











Temp  97.5 F L  02/18/18 12:00


 


Pulse  57 L  02/18/18 15:00


 


Resp  25 H  02/18/18 15:00


 


BP  173/83   02/18/18 15:00


 


Pulse Ox  93 L  02/18/18 15:00








 Intake & Output











 02/17/18 02/18/18 02/18/18





 18:59 06:59 18:59


 


Intake Total 2104.315 1265 625


 


Output Total 1345 845 550


 


Balance 759.315 420 75


 


Weight 127.7 kg 130.6 kg 


 


Intake:   


 


  IV 1150 1025 625


 


    Lactated Ringers 1,000 ml 1050 1025 525





    @ 75 mls/hr IV .[id number]   





    [facility] Rx#:[id number]   


 


    Meropenem 1 gm In Sodium 100  100





    Chloride 0.9% 100 ml @   





    100 mls/hr IVPB Q8HR [facility]   





    Rx#:[id number]   


 


  Intake, IV Titration 394.315  





  Amount   


 


    Lactated Ringers 1,000 ml 265  





    @ 75 mls/hr IV .[id number]   





    [facility] Rx#:[id number]   


 


    Propofol 1,000 mg In 129.315  





    Empty Bag 1 bag @ Titrate   





    IV .Q0M [facility] Rx#:   





    [id number]   


 


  Oral 560 240 


 


Output:   


 


  Drainage 100 150 50


 


    Right Abdomen 100 150 50


 


  Urine 1245 695 500


 


Other:   


 


  Voiding Method Indwelling Catheter Indwelling Catheter Indwelling Catheter


 


  # Voids 1  


 


  # Bowel Movements 1 1 3


 


  # Emeses  1 














- Exam





GENERAL:  Well developed and in no acute distress. Pleasant.


HEENT:  No sclera icterus. Extraocular movements grossly intact.  Moist buccal 

mucosa. Head is atraumatic, normocephalic. Hears conversational speech. No 

nasal drainage.


NECK:  Supple without lymphadenopathy.


CHEST:  Non-labored respirations and equal bilateral excursions. 


CARDIOVASCULAR:  Regular rate and rhythm.  Palpable 2+ radial pulses.


ABDOMEN:  Soft, nontender.  Nondistended.  AIDAN serosanguineous.


MUSCULOSKELETAL:  No clubbing, cyanosis or edema.


NEUROLOGIC:  No focal or lateralizing signs. 


PSYCH:  Appropriate affect.  Alert and oriented to person, place and time.


SKIN: Well perfused.  Good skin turgor.





- Labs


CBC & Chem 7: 


 02/18/18 04:57





 02/18/18 04:57


Labs: 


 Abnormal Lab Results - Last 24 Hours (Table)











  02/17/18 02/18/18 02/18/18 Range/Units





  18:16 00:08 04:57 


 


WBC    14.0 H  (3.8-10.6)  k/uL


 


Neutrophils #    12.5 H  (1.3-7.7)  k/uL


 


Lymphocytes #    0.7 L  (1.0-4.8)  k/uL


 


Sodium     (137-145)  mmol/L


 


Carbon Dioxide     (22-30)  mmol/L


 


BUN     (7-17)  mg/dL


 


Glucose     (74-99)  mg/dL


 


POC Glucose (mg/dL)  146 H  179 H   (75-99)  mg/dL


 


AST     (14-36)  U/L


 


ALT     (9-52)  U/L


 


Total Protein     (6.3-8.2)  g/dL


 


Albumin     (3.5-5.0)  g/dL














  02/18/18 02/18/18 02/18/18 Range/Units





  04:57 07:36 11:05 


 


WBC     (3.8-10.6)  k/uL


 


Neutrophils #     (1.3-7.7)  k/uL


 


Lymphocytes #     (1.0-4.8)  k/uL


 


Sodium  146 H    (137-145)  mmol/L


 


Carbon Dioxide  35 H    (22-30)  mmol/L


 


BUN  36 H    (7-17)  mg/dL


 


Glucose  162 H    (74-99)  mg/dL


 


POC Glucose (mg/dL)   163 H  155 H  (75-99)  mg/dL


 


AST  51 H    (14-36)  U/L


 


ALT  99 H    (9-52)  U/L


 


Total Protein  5.4 L    (6.3-8.2)  g/dL


 


Albumin  2.8 L    (3.5-5.0)  g/dL














Assessment and Plan


(1) Morbid obesity with BMI of 45.0-49.9, adult


Current Visit: Yes   Status: Acute   Code(s): E66.01 - MORBID (SEVERE) OBESITY 

DUE TO EXCESS CALORIES; Z68.42 - BODY MASS INDEX (BMI) 45.0-49.9, ADULT   

SNOMED Code(s): 488594315


   





(2) Acute cholecystitis


Current Visit: Yes   Status: Acute   Code(s): K81.0 - ACUTE CHOLECYSTITIS   

SNOMED Code(s): 92270019


   





(3) Acute hypoxemic respiratory failure


Current Visit: Yes   Status: Acute   Code(s): J96.01 - ACUTE RESPIRATORY 

FAILURE WITH HYPOXIA   SNOMED Code(s): 048360173


   





(4) Acute exacerbation of chronic obstructive pulmonary disease (COPD)


Current Visit: No   Status: Acute   Code(s): J44.1 - CHRONIC OBSTRUCTIVE 

PULMONARY DISEASE W (ACUTE) EXACERBATION   SNOMED Code(s): 686075324


   


Plan: 





1.  She may have diet as tolerated.


2.  When medically stable, may transfer to surgical floor.

## 2018-02-18 NOTE — XR
EXAMINATION TYPE: XR chest 1V portable

 

DATE OF EXAM: 2/18/2018

 

HISTORY: vent.

 

REFERENCE: Previous study dated 2/17/2018.

 

FINDINGS: The heart is enlarged. The lungs are clear. Pleural spaces are clear.

 

IMPRESSION: 

 

CARDIOMEGALY.

## 2018-02-18 NOTE — PN
PROGRESS NOTE



DATE OF SERVICE:

02/18/2018



The patient remains in ICU.  The patient was extubated yesterday, remains on 02,

remains stable  Per nursing staff, patient was confused earlier.  She was 
placed on

BiPAP and she was saturating well.



On general exam patient is awake and alert.  She is in no acute distress. HEENT

atraumatic, normocephalic.  Pupils equal and reactive to light.  Extraocular 
movements

intact.  Buccal mucosa is fair.  Neck is supple.  No goiter or lymphadenopathy.
  JVD is

negative.  No carotid bruit heard.  Lungs with decreased breath sounds 
bilaterally.

Heart is regular rate and rhythm without any murmurs or gallop rhythm.  Abdomen 
is

soft, nontender, nondistended.  Bowel sounds positive. Extremities with no edema

clubbing or cyanosis.  Neurological examination:  No gross motor or sensory 
deficit.

Patient moves all extremities.  Psychiatric examination:  Patient has normal 
mood and

affect.  Normal mental status examination.  Lymphatics:  No lymphadenopathy

appreciated. Skin is warm, dry and intact.



LAB:

CBC with white count of 15, hemoglobin 12.7, hematocrit 41 and platelet count 
of 333.

Chemical profile:  Sodium 146, potassium 4.4, chloride 103, bicarb 35, BUN 36,

creatinine 0.7, glucose 162.



ASSESSMENT:

1. Hypoxemic respiratory failure, status post extubation, possibly secondary to

    underlying chronic obstructive pulmonary disease.

2. Postoperative hypoxemic respiratory failure.

3. Status post laparoscopic cholecystectomy for gangrenous cholecystitis.  
Patient has

    AIDAN drain in place..

5. Morbid obesity.

6. Hyperlipidemia.

7. Hypertension.

The patient remains on IV Merrem, IV Solu-Medrol, Protonix.  Propofol has been

discontinued.  Pulmonary is following.



PLAN:

Transfer the patient  in about 24 hours.  Continue with incentive spirometry, GI

and DVT prophylaxis.





MMODL / IJN: [id number] / Job#: [id number]

MTDD

## 2018-02-19 LAB
ALBUMIN SERPL-MCNC: 3.1 G/DL (ref 3.5–5)
ALP SERPL-CCNC: 87 U/L (ref 38–126)
ALT SERPL-CCNC: 93 U/L (ref 9–52)
ANION GAP SERPL CALC-SCNC: 9 MMOL/L
AST SERPL-CCNC: 51 U/L (ref 14–36)
BUN SERPL-SCNC: 34 MG/DL (ref 7–17)
CALCIUM SPEC-MCNC: 9.2 MG/DL (ref 8.4–10.2)
CHLORIDE SERPL-SCNC: 102 MMOL/L (ref 98–107)
CO2 SERPL-SCNC: 34 MMOL/L (ref 22–30)
GLUCOSE BLD-MCNC: 138 MG/DL (ref 75–99)
GLUCOSE BLD-MCNC: 155 MG/DL (ref 75–99)
GLUCOSE BLD-MCNC: 161 MG/DL (ref 75–99)
GLUCOSE BLD-MCNC: 186 MG/DL (ref 75–99)
GLUCOSE SERPL-MCNC: 149 MG/DL (ref 74–99)
MAGNESIUM SPEC-SCNC: 2.4 MG/DL (ref 1.6–2.3)
POTASSIUM SERPL-SCNC: 4.8 MMOL/L (ref 3.5–5.1)
PROT SERPL-MCNC: 5.8 G/DL (ref 6.3–8.2)
SODIUM SERPL-SCNC: 145 MMOL/L (ref 137–145)

## 2018-02-19 RX ADMIN — METHYLPREDNISOLONE SODIUM SUCCINATE SCH MG: 40 INJECTION, POWDER, FOR SOLUTION INTRAMUSCULAR; INTRAVENOUS at 12:14

## 2018-02-19 RX ADMIN — METHYLPREDNISOLONE SODIUM SUCCINATE SCH MG: 40 INJECTION, POWDER, FOR SOLUTION INTRAMUSCULAR; INTRAVENOUS at 23:40

## 2018-02-19 RX ADMIN — Medication SCH UNIT: at 12:15

## 2018-02-19 RX ADMIN — FUROSEMIDE SCH MG: 40 TABLET ORAL at 08:31

## 2018-02-19 RX ADMIN — MEROPENEM SCH MLS/HR: 1 INJECTION, POWDER, FOR SOLUTION INTRAVENOUS at 23:40

## 2018-02-19 RX ADMIN — INSULIN ASPART SCH UNIT: 100 INJECTION, SOLUTION INTRAVENOUS; SUBCUTANEOUS at 21:42

## 2018-02-19 RX ADMIN — PRAVASTATIN SODIUM SCH MG: 20 TABLET ORAL at 09:08

## 2018-02-19 RX ADMIN — POTASSIUM CHLORIDE SCH MLS/HR: 14.9 INJECTION, SOLUTION INTRAVENOUS at 23:44

## 2018-02-19 RX ADMIN — MEROPENEM SCH MLS/HR: 1 INJECTION, POWDER, FOR SOLUTION INTRAVENOUS at 19:01

## 2018-02-19 RX ADMIN — MEROPENEM SCH MLS/HR: 1 INJECTION, POWDER, FOR SOLUTION INTRAVENOUS at 09:00

## 2018-02-19 RX ADMIN — PANTOPRAZOLE SODIUM SCH MG: 40 INJECTION, POWDER, FOR SOLUTION INTRAVENOUS at 08:31

## 2018-02-19 RX ADMIN — IPRATROPIUM BROMIDE SCH MG: 0.5 SOLUTION RESPIRATORY (INHALATION) at 20:20

## 2018-02-19 RX ADMIN — FENOFIBRATE SCH MG: 160 TABLET ORAL at 08:30

## 2018-02-19 RX ADMIN — DOCUSATE SODIUM SCH: 100 CAPSULE, LIQUID FILLED ORAL at 21:42

## 2018-02-19 RX ADMIN — POTASSIUM CHLORIDE SCH MLS/HR: 14.9 INJECTION, SOLUTION INTRAVENOUS at 12:26

## 2018-02-19 RX ADMIN — INSULIN ASPART SCH UNIT: 100 INJECTION, SOLUTION INTRAVENOUS; SUBCUTANEOUS at 08:59

## 2018-02-19 RX ADMIN — INSULIN ASPART SCH UNIT: 100 INJECTION, SOLUTION INTRAVENOUS; SUBCUTANEOUS at 18:59

## 2018-02-19 RX ADMIN — ONDANSETRON PRN MG: 2 INJECTION INTRAMUSCULAR; INTRAVENOUS at 12:30

## 2018-02-19 RX ADMIN — INSULIN ASPART SCH UNIT: 100 INJECTION, SOLUTION INTRAVENOUS; SUBCUTANEOUS at 12:25

## 2018-02-19 RX ADMIN — IPRATROPIUM BROMIDE SCH MG: 0.5 SOLUTION RESPIRATORY (INHALATION) at 09:10

## 2018-02-19 RX ADMIN — DOCUSATE SODIUM SCH: 100 CAPSULE, LIQUID FILLED ORAL at 08:32

## 2018-02-19 RX ADMIN — IPRATROPIUM BROMIDE SCH MG: 0.5 SOLUTION RESPIRATORY (INHALATION) at 16:38

## 2018-02-19 RX ADMIN — ENOXAPARIN SODIUM SCH MG: 40 INJECTION SUBCUTANEOUS at 08:31

## 2018-02-19 RX ADMIN — METHYLPREDNISOLONE SODIUM SUCCINATE SCH MG: 40 INJECTION, POWDER, FOR SOLUTION INTRAMUSCULAR; INTRAVENOUS at 19:03

## 2018-02-19 RX ADMIN — IPRATROPIUM BROMIDE SCH MG: 0.5 SOLUTION RESPIRATORY (INHALATION) at 12:11

## 2018-02-19 RX ADMIN — METHYLPREDNISOLONE SODIUM SUCCINATE SCH MG: 40 INJECTION, POWDER, FOR SOLUTION INTRAMUSCULAR; INTRAVENOUS at 06:56

## 2018-02-19 NOTE — P.PN
Subjective


Progress Note Date: 02/19/18


Principal diagnosis: 





Post cholecystectomy hypoxic respiratory failure, unexpected.  Requiring 

mechanical ventilation.





This is a 63-year-old female, known history of COPD, obesity, hypertension, 

hyperlipidemia, history of cardiac arrhythmia in the form of atrial 

fibrillation requiring ablation, patient presented to the ER yesterday with 3 

days history of right upper quadrant pain.  CT of the abdomen and pelvis showed 

evidence of cholelithiasis and pericholecystic fluid.  Patient was seen by 

surgery on consultation, and she underwent laparoscopic cholecystectomy last 

night.  Post operatively, patient was extubated in the recovery room, however 

she had to be reintubated because of hypoxic respiratory failure.  Patient was 

sent to the ICU on mechanical ventilation, and I saw this morning she was still 

on relatively high FiO2 80%, PEEP of 5, and her ABG was rather marginal.  Her 

pO2 was 77 pCO2 was 49 pH of 7.39.  Initial ABG post intubation showed a pO2 of 

99 pCO2 of 51 and pH of 7.34.  The chest x-ray showed minimal bibasilar 

atelectasis, not severe enough to explain the degree of hypoxemia noted on the 

ABG.  Her O2 saturation on 80% was 94%, and as I went down to extubate percent, 

her O2 saturation remained basically about the same, 92%.  Considering that no 

change with dropping down the FiO2, I felt the patient may have to be evaluated 

for possible intracardiac right-to-left shunt.  Hence I recommended an urgent 

echocardiogram/contrast study to rule out an intracardiac shunt.  The patient 

is presently sedated, and not much history could be obtained from the patient.  

Chest x-ray and labs as well as medications were all reviewed.





Reevaluated today on 2/16/2018, patient remains on mechanical ventilation, 

sedated, on propofol.  Continues on relatively high FiO2 of 60%, PEEP is now up 

to 12, continues to have marginal saturations, and actually I'm not noticing 

much change in her oxygenation whether she is on 60% or 100%.  Hence I'm still 

suspecting that the patient has an intracardiac shunt.  Her echocardiogram was 

nondiagnostic, I discussed her condition with the cardiologist, and he may 

proceed with ALFIE to assess for possible shunt.  Her chest x-ray and CT of the 

chest are not impressive, no findings were noted to explain the profound degree 

of relative hypoxemia noted on the ABG.  Her ABG this morning on 60% and PEEP 

of 10 showed a pO2 of 65 pCO2 of 48 pH of 7.43.  CBC is relatively normal, 

basic metabolic profile is normal.  Liver enzymes are slightly elevated.  Her 

vent settings are tidal volume of 450, assist control rate of 24, FiO2 is 60%, 

PEEP is presently at 12.  Not much change was noted by increasing PEEP.  As a 

matter of fact not much changed noted with her oxygenation even with increasing 

her FiO2.  I plan to try the patient on 50% and see if there is any significant 

change.





Patient was reevaluated today on 2/17/2018, remains on mechanical ventilation, 

remains on a PEEP of 12, FiO2 was 60% earlier, but even cutting it down to 50% 

did not make much of a difference.  Patient was switched to a pressure support 

and SIMV mode of mechanical ventilation, for about one hour, then she was 

placed on CPAP and pressure support mode of mechanical ventilation.  After 1 

hour, her ABG was done and showed a pO2 of 93 pCO2 of 47 pH of 7.47.  Patient 

was hypoventilating, but that did not reflect on her pCO2 based on the ABG.  

Hence I recommended extubating the patient to BiPAP, and this was already at 

bedside.  Her chest x-ray is showing improvement.  Her ABG seems to be better 

overall, however I still believe the patient should be extubated to BiPAP 

because of the hypoventilatory status.  For her metabolic alkalosis, I believe 

it is mostly a contraction alkalosis, has the patient will be given some fluids.





Patient was reevaluated today on 2/18/2018, patient has been on nasal cannula 

since extubation yesterday, however she was noted to be slightly confused 

earlier today, and she was placed on BiPAP.  Saturating well on 40% FiO2 and 

BiPAP.  Patient is alert, oriented, does not seem to be in any form of 

distress.  Labs were reviewed including basic metabolic profile which is 

relatively normal, and CBC was normal.  Chest x-ray showed cardiomegaly and no 

evidence of any active disease.





Patient was reevaluated today on 2/19/2018, doing relatively well, on nasal 

cannula, in no distress.  Continues to have BiPAP at bedside, but apparently 

was not used today or last night.  It was used yesterday when she was noted to 

be confused admits.  Labs were reviewed basically unremarkable except for 

slightly elevated liver enzymes.  Renal profile is normal.











Objective





- Vital Signs


Vital signs: 


 Vital Signs











Temp  97.9 F   02/19/18 08:00


 


Pulse  65   02/19/18 12:22


 


Resp  25 H  02/19/18 12:00


 


BP  140/86   02/19/18 12:00


 


Pulse Ox  95   02/19/18 12:00








 Intake & Output











 02/18/18 02/19/18 02/19/18





 18:59 06:59 18:59


 


Intake Total 8063 885 3998


 


Output Total 955 845 945


 


Balance 145 30 55


 


Weight  133 kg 133 kg


 


Intake:   


 


  IV 1100 875 550


 


    Lactated Ringers 1,000 ml 900 675 450





    @ 75 mls/hr IV .[id number]   





    [facility] Rx#:[id number]   


 


    Meropenem 1 gm In Sodium 200 200 100





    Chloride 0.9% 100 ml @   





    100 mls/hr IVPB Q8HR [facility]   





    Rx#:[id number]   


 


  Oral   450


 


Output:   


 


  Drainage 100 70 50


 


    Right Abdomen 100 70 50


 


  Urine 855 775 895


 


Other:   


 


  Voiding Method Indwelling Catheter Indwelling Catheter Indwelling Catheter


 


  # Voids  1 


 


  # Bowel Movements 3  2














- Exam





Physical Exam: Revealed a 63-year-old female, obese, on nasal cannula, in no 

distress.


HEENT:[Neck is supple.] [No neck masses.] [No thyromegaly.] [No JVD.]  Moist 

mucous membranes.


Chest: Diminished breath sounds at the bases., no crackles, no rhonchi, no 

wheezes.]


Cardiac Exam: [Normal S1 and S2, no S3 gallop, no murmur.]


Abdomen: [Obese, postsurgical Soft, nontender,  no megaly, no rebound, no 

guarding, diminished bowel sounds.]


Extremities: [No clubbing, no edema, no cyanosis.]


Neurological Exam: [No gross focal neurologic deficit was noted when she was 

off sedation.


Psychiatric: Normal mood and affect, normal mental status examination noted.


Lymphatics: No lymphadenopathy was appreciated.











- Labs


CBC & Chem 7: 


 02/18/18 04:57





 02/19/18 03:55


Labs: 


 Abnormal Lab Results - Last 24 Hours (Table)











  02/18/18 02/18/18 02/19/18 Range/Units





  17:06 21:03 03:55 


 


Carbon Dioxide    34 H  (22-30)  mmol/L


 


BUN    34 H  (7-17)  mg/dL


 


Glucose    149 H  (74-99)  mg/dL


 


POC Glucose (mg/dL)  148 H  143 H   (75-99)  mg/dL


 


Magnesium    2.4 H  (1.6-2.3)  mg/dL


 


AST    51 H  (14-36)  U/L


 


ALT    93 H  (9-52)  U/L


 


Total Protein    5.8 L  (6.3-8.2)  g/dL


 


Albumin    3.1 L  (3.5-5.0)  g/dL














  02/19/18 02/19/18 Range/Units





  07:10 12:15 


 


Carbon Dioxide    (22-30)  mmol/L


 


BUN    (7-17)  mg/dL


 


Glucose    (74-99)  mg/dL


 


POC Glucose (mg/dL)  138 H  155 H  (75-99)  mg/dL


 


Magnesium    (1.6-2.3)  mg/dL


 


AST    (14-36)  U/L


 


ALT    (9-52)  U/L


 


Total Protein    (6.3-8.2)  g/dL


 


Albumin    (3.5-5.0)  g/dL














Assessment and Plan


Assessment: 


Impression:





1 Postoperative hypoxic respiratory failure, unexpected, most likely related to 

bibasilar atelectasis, underlying COPD,  Patient was extubated on 2/17/2019, 

and tolerated the extubation well, however intermittently he may require BiPAP.





3 history of multiple comorbidities including obesity, benign essential 

hypertension, COPD, hypertriglyceridemia.





Recommendation: Continue present supportive care measures, continue BiPAP, as 

needed, incentive spirometry and ambulate patient. continue bronchodilators, 

continue O2, GI and DVT prophylaxis, we'll likely transfer out of the ICU today.


Time with Patient: Less than 30

## 2018-02-19 NOTE — P.PN
Subjective


Progress Note Date: 02/17/18





Patient is a 63-year-old female who has been extubated today after several days 

on the ventilator for unknown reason for acute respiratory distress syndrome.  

"I feel great.".  No reports of abdominal pain.  She is mentally alert.  Family 

is at bedside.  She is status post laparoscopic cholecystectomy.





Objective





- Vital Signs


Vital signs: 


 Vital Signs











Temp  97.5 F L  02/18/18 12:00


 


Pulse  57 L  02/18/18 15:00


 


Resp  25 H  02/18/18 15:00


 


BP  173/83   02/18/18 15:00


 


Pulse Ox  93 L  02/18/18 15:00








 Intake & Output











 02/17/18 02/18/18 02/18/18





 18:59 06:59 18:59


 


Intake Total 2104.315 1265 625


 


Output Total 1345 845 550


 


Balance 759.315 420 75


 


Weight 127.7 kg 130.6 kg 


 


Intake:   


 


  IV 1150 1025 625


 


    Lactated Ringers 1,000 ml 1050 1025 525





    @ 75 mls/hr IV .[id number]   





    [facility] Rx#:[id number]   


 


    Meropenem 1 gm In Sodium 100  100





    Chloride 0.9% 100 ml @   





    100 mls/hr IVPB Q8HR [facility]   





    Rx#:[id number]   


 


  Intake, IV Titration 394.315  





  Amount   


 


    Lactated Ringers 1,000 ml 265  





    @ 75 mls/hr IV .[id number]   





    [facility] Rx#:[id number]   


 


    Propofol 1,000 mg In 129.315  





    Empty Bag 1 bag @ Titrate   





    IV .Q0M [facility] Rx#:   





    [id number]   


 


  Oral 560 240 


 


Output:   


 


  Drainage 100 150 50


 


    Right Abdomen 100 150 50


 


  Urine 1245 695 500


 


Other:   


 


  Voiding Method Indwelling Catheter Indwelling Catheter Indwelling Catheter


 


  # Voids 1  


 


  # Bowel Movements 1 1 3


 


  # Emeses  1 














- Exam





GENERAL:  Well developed and in no acute distress. Pleasant.


HEENT:  No sclera icterus. Extraocular movements grossly intact.  Moist buccal 

mucosa. Head is atraumatic, normocephalic. Hears conversational speech. No 

nasal drainage.


NECK:  Supple without lymphadenopathy.


CHEST:  Non-labored respirations and equal bilateral excursions. 


CARDIOVASCULAR:  Regular rate and rhythm.  Palpable 2+ radial pulses.


ABDOMEN:  Soft, nontender.  Obese. Nondistended.  AIDAN serosanguinous.


MUSCULOSKELETAL:  No clubbing, cyanosis or edema.


NEUROLOGIC:  No focal or lateralizing signs. 


PSYCH:  Appropriate affect.  Alert and oriented to person, place and time.


SKIN: Well perfused.  Good skin turgor.





- Labs


CBC & Chem 7: 


 02/18/18 04:57





 02/19/18 03:55


Labs: 


 Abnormal Lab Results - Last 24 Hours (Table)











  02/17/18 02/18/18 02/18/18 Range/Units





  18:16 00:08 04:57 


 


WBC    14.0 H  (3.8-10.6)  k/uL


 


Neutrophils #    12.5 H  (1.3-7.7)  k/uL


 


Lymphocytes #    0.7 L  (1.0-4.8)  k/uL


 


Sodium     (137-145)  mmol/L


 


Carbon Dioxide     (22-30)  mmol/L


 


BUN     (7-17)  mg/dL


 


Glucose     (74-99)  mg/dL


 


POC Glucose (mg/dL)  146 H  179 H   (75-99)  mg/dL


 


AST     (14-36)  U/L


 


ALT     (9-52)  U/L


 


Total Protein     (6.3-8.2)  g/dL


 


Albumin     (3.5-5.0)  g/dL














  02/18/18 02/18/18 02/18/18 Range/Units





  04:57 07:36 11:05 


 


WBC     (3.8-10.6)  k/uL


 


Neutrophils #     (1.3-7.7)  k/uL


 


Lymphocytes #     (1.0-4.8)  k/uL


 


Sodium  146 H    (137-145)  mmol/L


 


Carbon Dioxide  35 H    (22-30)  mmol/L


 


BUN  36 H    (7-17)  mg/dL


 


Glucose  162 H    (74-99)  mg/dL


 


POC Glucose (mg/dL)   163 H  155 H  (75-99)  mg/dL


 


AST  51 H    (14-36)  U/L


 


ALT  99 H    (9-52)  U/L


 


Total Protein  5.4 L    (6.3-8.2)  g/dL


 


Albumin  2.8 L    (3.5-5.0)  g/dL














Assessment and Plan


(1) Acute cholecystitis


Current Visit: Yes   Status: Acute   Code(s): K81.0 - ACUTE CHOLECYSTITIS   

SNOMED Code(s): 90625284


   





(2) Acute hypoxemic respiratory failure


Current Visit: Yes   Status: Acute   Code(s): J96.01 - ACUTE RESPIRATORY 

FAILURE WITH HYPOXIA   SNOMED Code(s): 601806156


   





(3) Hypoxemic respiratory failure, chronic


Current Visit: Yes   Status: Acute   Code(s): J96.11 - CHRONIC RESPIRATORY 

FAILURE WITH HYPOXIA   SNOMED Code(s): 396805186


   





(4) Morbid obesity with BMI of 45.0-49.9, adult


Current Visit: Yes   Status: Acute   Code(s): E66.01 - MORBID (SEVERE) OBESITY 

DUE TO EXCESS CALORIES; Z68.42 - BODY MASS INDEX (BMI) 45.0-49.9, ADULT   

SNOMED Code(s): 150126020


   





(5) Status post cholecystectomy


Current Visit: Yes   Status: Acute   Code(s): Z90.49 - ACQUIRED ABSENCE OF 

OTHER SPECIFIED PARTS OF DIGESTIVE TRACT   SNOMED Code(s): 910666266


   





(6) Transaminitis


Current Visit: Yes   Status: Acute   Code(s): R74.0 - NONSPEC ELEV OF LEVELS OF 

TRANSAMNS & LACTIC ACID DEHYDRGNSE   SNOMED Code(s): 561123449


   


Plan: 





1.  May start diet with liquids.


2.  Continue with ICU care.

## 2018-02-19 NOTE — P.PN
<Carmelina Damico - Last Filed: 02/19/18 13:41>





Subjective


Progress Note Date: 02/19/18





63-year-old female sitting up in bed.  Using the incentive spirometer.  

Achieving 1500.  Patient currently is reporting no abdominal pain no nausea no 

vomiting.  Patient states she feels well.  Patient states I don't remember what 

happened to me.





Patient is postop February 14 laparoscopic cholecystectomy for acute gangrenous 

cholecystitis, cholelithiasis.  Postoperatively patient needed to be 

reintubated due to acute hypoxic respiratory failure.





Objective





- Vital Signs


Vital signs: 


 Vital Signs











Temp  97.9 F   02/19/18 08:00


 


Pulse  65   02/19/18 12:22


 


Resp  25 H  02/19/18 12:00


 


BP  140/86   02/19/18 12:00


 


Pulse Ox  95   02/19/18 12:00








 Intake & Output











 02/18/18 02/19/18 02/19/18





 18:59 06:59 18:59


 


Intake Total 0488 911 4932


 


Output Total 955 845 945


 


Balance 145 30 55


 


Weight  133 kg 133 kg


 


Intake:   


 


  IV 1100 875 550


 


    Lactated Ringers 1,000 ml 900 675 450





    @ 75 mls/hr IV .[id number]   





    [facility] Rx#:[id number]   


 


    Meropenem 1 gm In Sodium 200 200 100





    Chloride 0.9% 100 ml @   





    100 mls/hr IVPB Q8HR [facility]   





    Rx#:[id number]   


 


  Oral   450


 


Output:   


 


  Drainage 100 70 50


 


    Right Abdomen 100 70 50


 


  Urine 855 775 895


 


Other:   


 


  Voiding Method Indwelling Catheter Indwelling Catheter Indwelling Catheter


 


  # Voids  1 


 


  # Bowel Movements 3  2














- Exam





Physical exam


63-year-old female sitting up in bed appears in no acute distress oriented to 

person and place no recall of event


Lungs adequate air movement bilaterally using the incentive spirometer 

achieving 1200 little cough noted


Heart S1-S2 audible regular


Abdomen surgical incision sites dry AIDAN drain serosanguineous drainage noted not 

distended nontender bowel tones present


Extremities Venodyne's on to the bilateral lower extremity





- Labs


CBC & Chem 7: 


 02/18/18 04:57





 02/19/18 03:55


Labs: 


 Abnormal Lab Results - Last 24 Hours (Table)











  02/18/18 02/18/18 02/19/18 Range/Units





  17:06 21:03 03:55 


 


Carbon Dioxide    34 H  (22-30)  mmol/L


 


BUN    34 H  (7-17)  mg/dL


 


Glucose    149 H  (74-99)  mg/dL


 


POC Glucose (mg/dL)  148 H  143 H   (75-99)  mg/dL


 


Magnesium    2.4 H  (1.6-2.3)  mg/dL


 


AST    51 H  (14-36)  U/L


 


ALT    93 H  (9-52)  U/L


 


Total Protein    5.8 L  (6.3-8.2)  g/dL


 


Albumin    3.1 L  (3.5-5.0)  g/dL














  02/19/18 02/19/18 Range/Units





  07:10 12:15 


 


Carbon Dioxide    (22-30)  mmol/L


 


BUN    (7-17)  mg/dL


 


Glucose    (74-99)  mg/dL


 


POC Glucose (mg/dL)  138 H  155 H  (75-99)  mg/dL


 


Magnesium    (1.6-2.3)  mg/dL


 


AST    (14-36)  U/L


 


ALT    (9-52)  U/L


 


Total Protein    (6.3-8.2)  g/dL


 


Albumin    (3.5-5.0)  g/dL














Assessment and Plan


Assessment: 





Impression


Present on admission 3 day duration of right upper quadrant pain suspect due to 

an acute cholecystitis


CAT scan of the abdomen pelvis on admission showed evidence of cholelithiasis


Postop laparoscopic cholecystectomy due to an acute gangrenous cholecystitis 

done on February 14


Acute hypoxic respiratory failure unclear etiology needs to be urgently 

reintubated postoperatively


Morbid obesity BMI 44


Echocardiogram done February 15 no evidence of shunt no pulmonary hypertension 

left ventricular systolic function normal EF between 55 and 60%


Status post ALFIE with bubble study done on February 16 no evidence of a shunt





Plan


Okay to transfer out of the ICU


Continue postop surgical care


Increase activity


Continue recommendations by infectious disease


DVT and GI prophylaxis


Further surgical recommendations pending








Progress note dictated for Dr. Rob rounding on behalf of dr gilbert





The above impression and plan of care have been discussed and directed by 

signing physician. Carmelina Damico nurse practitioner acting as scribe for signing 

physician.





<Kelli Rob - Last Filed: 02/20/18 04:46>





Objective





- Vital Signs


Vital signs: 


 Vital Signs











Temp  97.2 F L  02/19/18 23:00


 


Pulse  54 L  02/19/18 23:00


 


Resp  16   02/19/18 23:00


 


BP  122/70   02/19/18 23:00


 


Pulse Ox  96   02/19/18 23:00








 Intake & Output











 02/19/18 02/19/18 02/20/18





 06:59 18:59 06:59


 


Intake Total 875 1700 75


 


Output Total 845 1615 80


 


Balance 30 85 -5


 


Weight 133 kg 133 kg 


 


Intake:   


 


   1000 75


 


    Lactated Ringers 1,000 ml 675 900 75





    @ 75 mls/hr IV .[id number]   





    [facility] Rx#:[id number]   


 


    Meropenem 1 gm In Sodium 200 100 





    Chloride 0.9% 100 ml @   





    100 mls/hr IVPB Q8HR [facility]   





    Rx#:[id number]   


 


  Oral  700 


 


Output:   


 


  Drainage 70 50 80


 


    Right Abdomen 70 50 80


 


  Urine 775 1565 


 


Other:   


 


  Voiding Method Indwelling Catheter Indwelling Catheter Bedside Commode





   Bedpan


 


  # Voids 1  3


 


  # Bowel Movements  2 














- Labs


CBC & Chem 7: 


 02/18/18 04:57





 02/19/18 03:55


Labs: 


 Abnormal Lab Results - Last 24 Hours (Table)











  02/19/18 02/19/18 02/19/18 Range/Units





  03:55 07:10 12:15 


 


Carbon Dioxide  34 H    (22-30)  mmol/L


 


BUN  34 H    (7-17)  mg/dL


 


Glucose  149 H    (74-99)  mg/dL


 


POC Glucose (mg/dL)   138 H  155 H  (75-99)  mg/dL


 


Magnesium  2.4 H    (1.6-2.3)  mg/dL


 


AST  51 H    (14-36)  U/L


 


ALT  93 H    (9-52)  U/L


 


Total Protein  5.8 L    (6.3-8.2)  g/dL


 


Albumin  3.1 L    (3.5-5.0)  g/dL














  02/19/18 02/19/18 Range/Units





  16:54 20:29 


 


Carbon Dioxide    (22-30)  mmol/L


 


BUN    (7-17)  mg/dL


 


Glucose    (74-99)  mg/dL


 


POC Glucose (mg/dL)  186 H  161 H  (75-99)  mg/dL


 


Magnesium    (1.6-2.3)  mg/dL


 


AST    (14-36)  U/L


 


ALT    (9-52)  U/L


 


Total Protein    (6.3-8.2)  g/dL


 


Albumin    (3.5-5.0)  g/dL














Assessment and Plan


(1) Acute cholecystitis


Current Visit: Yes   Status: Acute   Code(s): K81.0 - ACUTE CHOLECYSTITIS   

SNOMED Code(s): 26949631


   





(2) Acute hypoxemic respiratory failure


Current Visit: Yes   Status: Acute   Code(s): J96.01 - ACUTE RESPIRATORY 

FAILURE WITH HYPOXIA   SNOMED Code(s): 939349352


   





(3) Hypoxemic respiratory failure, chronic


Current Visit: Yes   Status: Acute   Code(s): J96.11 - CHRONIC RESPIRATORY 

FAILURE WITH HYPOXIA   SNOMED Code(s): 168574251


   





(4) Morbid obesity with BMI of 45.0-49.9, adult


Current Visit: Yes   Status: Acute   Code(s): E66.01 - MORBID (SEVERE) OBESITY 

DUE TO EXCESS CALORIES; Z68.42 - BODY MASS INDEX (BMI) 45.0-49.9, ADULT   

SNOMED Code(s): 669631764


   





(5) Status post cholecystectomy


Current Visit: Yes   Status: Acute   Code(s): Z90.49 - ACQUIRED ABSENCE OF 

OTHER SPECIFIED PARTS OF DIGESTIVE TRACT   SNOMED Code(s): 415137357


   





(6) Transaminitis


Current Visit: Yes   Status: Acute   Code(s): R74.0 - NONSPEC ELEV OF LEVELS OF 

TRANSAMNS & LACTIC ACID DEHYDRGNSE   SNOMED Code(s): 887321600

## 2018-02-19 NOTE — PN
PROGRESS NOTE



DATE OF SERVICE:

02/19/2018



Patient is seen and evaluated in ICU.  Patient had acute hypoxic respiratory failure

post cholecystectomy requiring mechanical ventilation.  Patient is currently extubated

and is on oxygen  2 L via nasal cannula.  She is in no acute distress.  Patient has a

BiPAP to use p.r.n.



VITAL SIGNS: Temperature 97.9, pulse 65, respiration 25, blood pressure 140/83, pulse

ox 95%.

HEENT: Atraumatic, normocephalic.  Pupils equal and reactive to light.  Extraocular

movements intact.  Buccal mucosa is moist.

NECK: Supple. No goiter or lymphadenopathy.  JVD is negative.  No carotid bruit heard.

LUNGS: Decreased breath sounds.  Occasional rhonchi.

Heart has regular rate, rhythm without any murmurs or gallop rhythm.

Abdomen is soft, obese, nontender, nondistended.  Bowel sounds positive. EXTREMITIES:

No edema, clubbing, cyanosis.

NEUROLOGICAL EXAMINATION: Cranial nerves 2-12 grossly intact.  No gross motor or

sensory deficit.

Skin is warm, dry and intact.

LYMPHATICS: No lymphadenopathy appreciated.



LABS:

CBC: White blood count of 14, hemoglobin 12.7, hematocrit 41, and platelet count of

333.

Chemical profile: Sodium 145, potassium 4.8, chloride 102, bicarb 34, BUN 34,

creatinine 0.6, glucose 149.



ASSESSMENT:

1. Postoperative hypoxemic respiratory failure, clinically resolved.  Patient is

    status post extubation.  Continue to have BiPAP p.r.n.

2. Status post laparoscopic cholecystectomy for gangrenous cholecystitis.  Patient has

    AIDAN drain in place.

3. Morbid obesity.

4. Hyperlipidemia.

5. Hypertension.

Patient remains stable on IV antibiotics, IV Solu-Medrol and Protonix.  Continue with

GI and DVT prophylaxis.  The patient is to continue with incentive spirometry. Plan is

to transfer her to the medical/surgical floor once stable.





MMODL / IJN: [id number] / Job#: [id number]

## 2018-02-20 LAB
ALBUMIN SERPL-MCNC: 3.1 G/DL (ref 3.5–5)
ALP SERPL-CCNC: 91 U/L (ref 38–126)
ALT SERPL-CCNC: 75 U/L (ref 9–52)
ANION GAP SERPL CALC-SCNC: 8 MMOL/L
AST SERPL-CCNC: 27 U/L (ref 14–36)
BUN SERPL-SCNC: 29 MG/DL (ref 7–17)
CALCIUM SPEC-MCNC: 9.4 MG/DL (ref 8.4–10.2)
CHLORIDE SERPL-SCNC: 92 MMOL/L (ref 98–107)
CO2 SERPL-SCNC: 40 MMOL/L (ref 22–30)
GLUCOSE BLD-MCNC: 155 MG/DL (ref 75–99)
GLUCOSE BLD-MCNC: 185 MG/DL (ref 75–99)
GLUCOSE BLD-MCNC: 210 MG/DL (ref 75–99)
GLUCOSE BLD-MCNC: 278 MG/DL (ref 75–99)
GLUCOSE SERPL-MCNC: 163 MG/DL (ref 74–99)
MAGNESIUM SPEC-SCNC: 2.2 MG/DL (ref 1.6–2.3)
POTASSIUM SERPL-SCNC: 4.7 MMOL/L (ref 3.5–5.1)
PROT SERPL-MCNC: 5.6 G/DL (ref 6.3–8.2)
SODIUM SERPL-SCNC: 140 MMOL/L (ref 137–145)

## 2018-02-20 RX ADMIN — INSULIN ASPART SCH UNIT: 100 INJECTION, SOLUTION INTRAVENOUS; SUBCUTANEOUS at 20:58

## 2018-02-20 RX ADMIN — DOCUSATE SODIUM SCH: 100 CAPSULE, LIQUID FILLED ORAL at 08:28

## 2018-02-20 RX ADMIN — PANTOPRAZOLE SODIUM SCH MG: 40 INJECTION, POWDER, FOR SOLUTION INTRAVENOUS at 08:28

## 2018-02-20 RX ADMIN — METHYLPREDNISOLONE SODIUM SUCCINATE SCH MG: 40 INJECTION, POWDER, FOR SOLUTION INTRAMUSCULAR; INTRAVENOUS at 17:50

## 2018-02-20 RX ADMIN — IPRATROPIUM BROMIDE SCH MG: 0.5 SOLUTION RESPIRATORY (INHALATION) at 12:29

## 2018-02-20 RX ADMIN — IPRATROPIUM BROMIDE SCH MG: 0.5 SOLUTION RESPIRATORY (INHALATION) at 19:05

## 2018-02-20 RX ADMIN — METHYLPREDNISOLONE SODIUM SUCCINATE SCH MG: 40 INJECTION, POWDER, FOR SOLUTION INTRAMUSCULAR; INTRAVENOUS at 11:50

## 2018-02-20 RX ADMIN — METHYLPREDNISOLONE SODIUM SUCCINATE SCH MG: 40 INJECTION, POWDER, FOR SOLUTION INTRAMUSCULAR; INTRAVENOUS at 05:36

## 2018-02-20 RX ADMIN — IPRATROPIUM BROMIDE SCH: 0.5 SOLUTION RESPIRATORY (INHALATION) at 09:37

## 2018-02-20 RX ADMIN — INSULIN ASPART SCH UNIT: 100 INJECTION, SOLUTION INTRAVENOUS; SUBCUTANEOUS at 08:26

## 2018-02-20 RX ADMIN — MEROPENEM SCH MLS/HR: 1 INJECTION, POWDER, FOR SOLUTION INTRAVENOUS at 09:18

## 2018-02-20 RX ADMIN — DOCUSATE SODIUM SCH: 100 CAPSULE, LIQUID FILLED ORAL at 20:06

## 2018-02-20 RX ADMIN — MEROPENEM SCH MLS/HR: 1 INJECTION, POWDER, FOR SOLUTION INTRAVENOUS at 23:29

## 2018-02-20 RX ADMIN — INSULIN ASPART SCH UNIT: 100 INJECTION, SOLUTION INTRAVENOUS; SUBCUTANEOUS at 17:50

## 2018-02-20 RX ADMIN — IPRATROPIUM BROMIDE SCH MG: 0.5 SOLUTION RESPIRATORY (INHALATION) at 15:20

## 2018-02-20 RX ADMIN — FENOFIBRATE SCH MG: 160 TABLET ORAL at 08:28

## 2018-02-20 RX ADMIN — Medication SCH UNIT: at 11:49

## 2018-02-20 RX ADMIN — MEROPENEM SCH MLS/HR: 1 INJECTION, POWDER, FOR SOLUTION INTRAVENOUS at 15:43

## 2018-02-20 RX ADMIN — FUROSEMIDE SCH MG: 40 TABLET ORAL at 08:28

## 2018-02-20 RX ADMIN — METHYLPREDNISOLONE SODIUM SUCCINATE SCH MG: 40 INJECTION, POWDER, FOR SOLUTION INTRAMUSCULAR; INTRAVENOUS at 23:53

## 2018-02-20 RX ADMIN — INSULIN ASPART SCH UNIT: 100 INJECTION, SOLUTION INTRAVENOUS; SUBCUTANEOUS at 11:50

## 2018-02-20 RX ADMIN — PRAVASTATIN SODIUM SCH MG: 20 TABLET ORAL at 08:28

## 2018-02-20 RX ADMIN — POTASSIUM CHLORIDE SCH MLS/HR: 14.9 INJECTION, SOLUTION INTRAVENOUS at 15:43

## 2018-02-20 RX ADMIN — ENOXAPARIN SODIUM SCH MG: 40 INJECTION SUBCUTANEOUS at 08:28

## 2018-02-20 NOTE — P.PN
Subjective


Progress Note Date: 02/20/18


Principal diagnosis: 


Postoperative hypoxic respiratory failure, related to bibasilar atelectasis, 

and underlying COPD.








This is a 63-year-old female, known history of COPD, obesity, hypertension, 

hyperlipidemia, history of cardiac arrhythmia in the form of atrial 

fibrillation requiring ablation, patient presented to the ER yesterday with 3 

days history of right upper quadrant pain.  CT of the abdomen and pelvis showed 

evidence of cholelithiasis and pericholecystic fluid.  Patient was seen by 

surgery on consultation, and she underwent laparoscopic cholecystectomy last 

night.  Post operatively, patient was extubated in the recovery room, however 

she had to be reintubated because of hypoxic respiratory failure.  Patient was 

sent to the ICU on mechanical ventilation, and I saw this morning she was still 

on relatively high FiO2 80%, PEEP of 5, and her ABG was rather marginal.  Her 

pO2 was 77 pCO2 was 49 pH of 7.39.  Initial ABG post intubation showed a pO2 of 

99 pCO2 of 51 and pH of 7.34.  The chest x-ray showed minimal bibasilar 

atelectasis, not severe enough to explain the degree of hypoxemia noted on the 

ABG.  Her O2 saturation on 80% was 94%, and as I went down to extubate percent, 

her O2 saturation remained basically about the same, 92%.  Considering that no 

change with dropping down the FiO2, I felt the patient may have to be evaluated 

for possible intracardiac right-to-left shunt.  Hence I recommended an urgent 

echocardiogram/contrast study to rule out an intracardiac shunt.  The patient 

is presently sedated, and not much history could be obtained from the patient.  

Chest x-ray and labs as well as medications were all reviewed.





Reevaluated today on 2/16/2018, patient remains on mechanical ventilation, 

sedated, on propofol.  Continues on relatively high FiO2 of 60%, PEEP is now up 

to 12, continues to have marginal saturations, and actually I'm not noticing 

much change in her oxygenation whether she is on 60% or 100%.  Hence I'm still 

suspecting that the patient has an intracardiac shunt.  Her echocardiogram was 

nondiagnostic, I discussed her condition with the cardiologist, and he may 

proceed with ALFIE to assess for possible shunt.  Her chest x-ray and CT of the 

chest are not impressive, no findings were noted to explain the profound degree 

of relative hypoxemia noted on the ABG.  Her ABG this morning on 60% and PEEP 

of 10 showed a pO2 of 65 pCO2 of 48 pH of 7.43.  CBC is relatively normal, 

basic metabolic profile is normal.  Liver enzymes are slightly elevated.  Her 

vent settings are tidal volume of 450, assist control rate of 24, FiO2 is 60%, 

PEEP is presently at 12.  Not much change was noted by increasing PEEP.  As a 

matter of fact not much changed noted with her oxygenation even with increasing 

her FiO2.  I plan to try the patient on 50% and see if there is any significant 

change.





Patient was reevaluated today on 2/17/2018, remains on mechanical ventilation, 

remains on a PEEP of 12, FiO2 was 60% earlier, but even cutting it down to 50% 

did not make much of a difference.  Patient was switched to a pressure support 

and SIMV mode of mechanical ventilation, for about one hour, then she was 

placed on CPAP and pressure support mode of mechanical ventilation.  After 1 

hour, her ABG was done and showed a pO2 of 93 pCO2 of 47 pH of 7.47.  Patient 

was hypoventilating, but that did not reflect on her pCO2 based on the ABG.  

Hence I recommended extubating the patient to BiPAP, and this was already at 

bedside.  Her chest x-ray is showing improvement.  Her ABG seems to be better 

overall, however I still believe the patient should be extubated to BiPAP 

because of the hypoventilatory status.  For her metabolic alkalosis, I believe 

it is mostly a contraction alkalosis, has the patient will be given some fluids.





Patient was reevaluated today on 2/18/2018, patient has been on nasal cannula 

since extubation yesterday, however she was noted to be slightly confused 

earlier today, and she was placed on BiPAP.  Saturating well on 40% FiO2 and 

BiPAP.  Patient is alert, oriented, does not seem to be in any form of 

distress.  Labs were reviewed including basic metabolic profile which is 

relatively normal, and CBC was normal.  Chest x-ray showed cardiomegaly and no 

evidence of any active disease.





Patient was reevaluated today on 2/19/2018, doing relatively well, on nasal 

cannula, in no distress.  Continues to have BiPAP at bedside, but apparently 

was not used today or last night.  It was used yesterday when she was noted to 

be confused admits.  Labs were reviewed basically unremarkable except for 

slightly elevated liver enzymes.  Renal profile is normal.





On 02/20/2018 patient seen again on medical surgical floor.  O2 is down to 3 L 

per nasal cannula, and did not wear her BiPAP mask last night.  She is awake, 

alert, denies any acute distress.  Lung sounds are active for scattered 

wheezes.  She remains afebrile, hemodynamically stable.  Her surgical pain is 

reasonably controlled.  She is compliant with her incentive spirometer, able to 

achieve 1500 on the today.  Abdominal incisions are clean dry and intact with 

approximated, AIDAN drain is present with moderate amount of serosanguineous 

drainage.  She is tolerating diet.  She has been up ambulating in the hallway, 

tolerated activity well.








Objective





- Vital Signs


Vital signs: 


 Vital Signs











Temp  97.7 F   02/20/18 07:00


 


Pulse  56 L  02/20/18 12:49


 


Resp  18   02/20/18 07:00


 


BP  113/54   02/20/18 07:00


 


Pulse Ox  97   02/20/18 07:00








 Intake & Output











 02/19/18 02/20/18 02/20/18





 18:59 06:59 18:59


 


Intake Total 1700 75 480


 


Output Total 1615 120 40


 


Balance 85 -45 440


 


Weight 133 kg  


 


Intake:   


 


  IV 1000 75 


 


    Lactated Ringers 1,000 ml 900 75 





    @ 75 mls/hr IV .[id number]   





    [facility] Rx#:[id number]   


 


    Meropenem 1 gm In Sodium 100  





    Chloride 0.9% 100 ml @   





    100 mls/hr IVPB Q8HR [facility]   





    Rx#:[id number]   


 


  Oral 700  480


 


Output:   


 


  Drainage 50 120 40


 


    Right Abdomen 50 120 40


 


  Urine 1565  


 


Other:   


 


  Voiding Method Indwelling Catheter Bedside Commode Bedside Commode





  Bedpan 


 


  # Voids  2 1


 


  # Bowel Movements 2  














- Exam


Physical Exam: Revealed a 63-year-old female, obese, on nasal cannula, in no 

distress.


HEENT:[Neck is supple.] [No neck masses.] [No thyromegaly.] [No JVD.]  Moist 

mucous membranes.


Chest: Diminished breath sounds at the bases., no crackles, no rhonchi, no 

wheezes.]


Cardiac Exam: [Normal S1 and S2, no S3 gallop, no murmur.]


Abdomen: [Obese, postsurgical Soft, nontender,  no megaly, no rebound, no 

guarding, diminished bowel sounds.  Incisions clean dry and intact, well 

approximated.  AIDAN drainage present with moderate amount of serosanguineous 

drainage]


Extremities: [No clubbing, no edema, no cyanosis.]


Neurological Exam: [No gross focal neurologic deficit was noted when she was 

off sedation.


Psychiatric: Normal mood and affect, normal mental status examination noted.


Lymphatics: No lymphadenopathy was appreciated.








- Labs


CBC & Chem 7: 


 02/18/18 04:57





 02/20/18 08:00


Labs: 


 Abnormal Lab Results - Last 24 Hours (Table)











  02/19/18 02/19/18 02/20/18 Range/Units





  16:54 20:29 06:56 


 


Chloride     ()  mmol/L


 


Carbon Dioxide     (22-30)  mmol/L


 


BUN     (7-17)  mg/dL


 


Glucose     (74-99)  mg/dL


 


POC Glucose (mg/dL)  186 H  161 H  155 H  (75-99)  mg/dL


 


Phosphorus     (2.5-4.5)  mg/dL


 


ALT     (9-52)  U/L


 


Total Protein     (6.3-8.2)  g/dL


 


Albumin     (3.5-5.0)  g/dL














  02/20/18 02/20/18 Range/Units





  08:00 11:21 


 


Chloride  92 L   ()  mmol/L


 


Carbon Dioxide  40 H*   (22-30)  mmol/L


 


BUN  29 H   (7-17)  mg/dL


 


Glucose  163 H   (74-99)  mg/dL


 


POC Glucose (mg/dL)   278 H  (75-99)  mg/dL


 


Phosphorus  4.7 H   (2.5-4.5)  mg/dL


 


ALT  75 H   (9-52)  U/L


 


Total Protein  5.6 L   (6.3-8.2)  g/dL


 


Albumin  3.1 L   (3.5-5.0)  g/dL














Assessment and Plan


Plan: 


Assessment:





1 Postoperative hypoxic respiratory failure, unexpected, most likely related to 

bibasilar atelectasis, underlying COPD,  Patient was extubated on 2/17/2019, 

and tolerated the extubation well, however intermittently he may require BiPAP.





3 history of multiple comorbidities including obesity, benign essential 

hypertension, COPD, hypertriglyceridemia.





Recommendation: 





Continue encouraging pulmonary toileting, continue incentive spirometry.  

Encourage ambulation, wean FiO2.  Obtain room air pulse ox, most likely patient 

will require home oxygen, at least temporarily.  Continue present supportive 

care measures, continue BiPAP, as needed. Continue bronchodilators, continue O2

, GI and DVT prophylaxis, pulmonary standpoint patient is stable for discharge 

once cleared by surgery.





I performed a history & physical examination of the patient and discussed their 

management with my nurse practitioner, Montserrat Mauricio.  I reviewed the nurse 

practitioner's note and agree with the documented findings and plan of care.  

Lung sounds are positive for some scattered wheezes throughout the lung fields.

  The findings and the impression was discussed with the patient.  I attest to 

the documentation by the nurse practitioner. 





Time with Patient: Less than 30

## 2018-02-20 NOTE — PN
PROGRESS NOTE



DATE OF SERVICE:

02/20/2018



This 63-year-old woman who was admitted after cholecystectomy had postoperative hypoxic

respiratory failure and multiple other medical issues.  The patient seems to be

improving at this time.  No chest pain.  No palpitations.  No fever.



On exam, alert and oriented x3.  Pulse is 74, blood pressure 122/70, respiration 18,

temperature 98.4, pulse ox 94% on 3 L.

HEENT: Conjunctivae normal.

NECK: No jugular venous distention.

CARDIOVASCULAR SYSTEM:  S1, S2 muffled.

RESPIRATORY SYSTEM: Breath sounds diminished at the bases.  A few scattered rhonchi and

crackles Expiratory wheezing.

ABDOMEN: Soft.

LEGS: No edema. No swelling.

NERVOUS SYSTEM: No focal deficit.



LABS: Sodium is 140, potassium 4.7.  Otherwise WBC 14.



ASSESSMENT:

1. Status post laparoscopic cholecystectomy for gangrenous cholecystitis with a AIDAN

    drain.

2. Acute hypoxic respiratory failure, possibly secondary to underlying chronic

    obstructive pulmonary disease, acute exacerbation, status post extubation.

3. Postoperative acute hypoxemic respiratory failure.

4. Morbid obesity.

5. Hypertension.

6. Hyperlipidemia.



RECOMMENDATIONS AND DISCUSSION:

I recommend to continue current medication, continue symptomatic treatment. Continue

with the bronchodilators.  Continue with empiric antibiotics. The creatinine has

normalized.  The patient is on a full-liquid diet.  We will continue to monitor.

Monitor fluid/electrolyte balance closely. Closely follow with Surgery. Further

recommendations to follow.





MMODL / IJN: [id number] / Job#: [id number]

## 2018-02-20 NOTE — P.PN
Subjective


Progress Note Date: 02/20/18





63-year-old female seen and examined sitting up on the edge of the bed pleasant 

cooperative talkative oriented to person place "I know I had  surgery don't 

remember anything after that currently tolerating a diet reports no nausea 

vomiting.  States abdominal pain improved pain medication effective for pain 

control states has not had a bowel movement.  AIDAN drain left lower quadrant 

serous sinus drainage noted agents being followed by infectious disease and 

pulmonary service currently nasal cannula 6 L using an incentive spirometer can 

achieve 1000 











Patient is postop February 14 laparoscopic cholecystectomy for acute gangrenous 

cholecystitis, cholelithiasis.  Postoperatively patient needed to be 

reintubated due to acute hypoxic respiratory failure.














Objective





- Vital Signs


Vital signs: 


 Vital Signs











Temp  97.7 F   02/20/18 07:00


 


Pulse  54 L  02/20/18 07:00


 


Resp  18   02/20/18 07:00


 


BP  113/54   02/20/18 07:00


 


Pulse Ox  97   02/20/18 07:00








 Intake & Output











 02/19/18 02/20/18 02/20/18





 18:59 06:59 18:59


 


Intake Total 1700 75 


 


Output Total 1615 120 


 


Balance 85 -45 


 


Weight 133 kg  


 


Intake:   


 


  IV 1000 75 


 


    Lactated Ringers 1,000 ml 900 75 





    @ 75 mls/hr IV .[id number]   





    CARYN Rx#:[id number]   


 


    Meropenem 1 gm In Sodium 100  





    Chloride 0.9% 100 ml @   





    100 mls/hr IVPB Q8HR [facility]   





    Rx#:[id number]   


 


  Oral 700  


 


Output:   


 


  Drainage 50 120 


 


    Right Abdomen 50 120 


 


  Urine 1565  


 


Other:   


 


  Voiding Method Indwelling Catheter Bedside Commode 





  Bedpan 


 


  # Voids  2 


 


  # Bowel Movements 2  














- Exam





Physical exam


63-year-old female sitting on the as the bed oriented times to pleasant 

cooperative


Lungs posterior diminished at the bases no  wheezing noted nasal cannula 6 L 

able to use IS achieve 1000 no cough noted 


Heart S1-S2 audible regular 


Abdomen obese soft AIDAN drain right lower quadrant serous sinus drainage no bowel 

movement states urinating no difficulty no nausea no vomiting tolerating diet 

surgical dressing sites dry few hypoactive bowel tones 


 extremities no edema to the lower extremities noted





- Labs


CBC & Chem 7: 


 02/18/18 04:57





 02/20/18 08:00


Labs: 


 Abnormal Lab Results - Last 24 Hours (Table)











  02/19/18 02/19/18 02/19/18 Range/Units





  12:15 16:54 20:29 


 


Chloride     ()  mmol/L


 


Carbon Dioxide     (22-30)  mmol/L


 


BUN     (7-17)  mg/dL


 


Glucose     (74-99)  mg/dL


 


POC Glucose (mg/dL)  155 H  186 H  161 H  (75-99)  mg/dL


 


Phosphorus     (2.5-4.5)  mg/dL


 


ALT     (9-52)  U/L


 


Total Protein     (6.3-8.2)  g/dL


 


Albumin     (3.5-5.0)  g/dL














  02/20/18 02/20/18 Range/Units





  06:56 08:00 


 


Chloride   92 L  ()  mmol/L


 


Carbon Dioxide   40 H*  (22-30)  mmol/L


 


BUN   29 H  (7-17)  mg/dL


 


Glucose   163 H  (74-99)  mg/dL


 


POC Glucose (mg/dL)  155 H   (75-99)  mg/dL


 


Phosphorus   4.7 H  (2.5-4.5)  mg/dL


 


ALT   75 H  (9-52)  U/L


 


Total Protein   5.6 L  (6.3-8.2)  g/dL


 


Albumin   3.1 L  (3.5-5.0)  g/dL














Assessment and Plan


Assessment: 





Impression


Present on admission 3 day duration of right upper quadrant pain suspect due to 

an acute cholecystitis


CAT scan of the abdomen pelvis on admission showed evidence of cholelithiasis


Postop laparoscopic cholecystectomy due to an acute gangrenous cholecystitis 

done on February 14


Acute hypoxic respiratory failure unclear etiology needs to be urgently 

reintubated postoperatively


Morbid obesity BMI 44


Echocardiogram done February 15 no evidence of shunt no pulmonary hypertension 

left ventricular systolic function normal EF between 55 and 60%


Status post ALFIE with bubble study done on February 16 no evidence of a shunt


Chronic diastolic heart failure as noted on echocardiogram














Plan


PT OT increase activity as tolerated


Continue recommendations pulmonary service attempt to titrate the O2 down keep 

the sats greater than 90%


Pain control


Continue postop surgical care


Increase activity


Continue recommendations by infectious disease


DVT and GI prophylaxis


Further surgical recommendations pending








Progress note dictated for Dr dr gilbert





The above impression and plan of care have been discussed and directed by 

signing physician. Carmelina Damico nurse practitioner acting as scribe for signing 

physician.

## 2018-02-20 NOTE — P.PN
Subjective


Progress Note Date: 02/20/18


Principal diagnosis: 





sepsis








This is a 63-year-old  female that presented to [facility] emergency center on February 13 with abdominal pain and been going on 

for 1 week and gradually worsening along with nausea, a couple episodes of 

vomiting and abdominal distention.  She went to her primary care physician and 

was sent into the emergency center.  Patient was given morphine, Zofran and 2 L 

of fluid.  She underwent a CAT scan of the abdomen and pelvis with contrast 

that showed a fatty infiltration of the liver.  Acute and chronic cholecystitis

, gallbladder calculi in the gallbladder neck probable impacted stone in the 

gallbladder neck.  Patchy atelectasis in the lung bases more on the right and 

mild ascites.  On February 14, patient went for laparoscopic cholecystectomy 

with Dr. Trimble finding a gangrenous gallbladder.  Following the procedure, 

patient was extubated and then re-intubated and then admitted into intensive 

care unit consult was placed with Dr. Hutton who is following for intensive 

care management.  Patient presented with a temperature 100.2, white count of 

22.8 which is improved to 10.8.  Creatinine is 1.1.  Liver function tests of 

been elevated currently with AST of 160, , alkaline phosphatase 1:30, 

albumin 2.7.  Patient has not required vasopressors.  Her urine output has been 

adequate.  Repeat chest x-ray this morning shows probable heart failure with 

increased infiltrate or atelectasis in the lung bases.  Patient has been 

treated with IV antibiotics the form of Zosyn and Ancef as Jong and was changed 

to meropenem by ID.  Patient remains intubated at this time and appears 

comfortable on mechanical ventilation.


02/16/2018 patient remains in intensive care unit intubated sedated and 

mechanically ventilated.  On propofol she however is arousable and interactive.

  Continues to have respiratory failure with what appears to be an acute lung 

injury requiring PEEP of 12 which is allowed improvement of her oxygenation and 

hemodynamics.


On 02/19/2018 patient is improved.  She has been extubated.  Since on BiPAP 

overnight but as tolerated O2 by nasal cannula throughout the day.  She is 

eating some clear liquids without difficulty.  Has no other acute complaints.  

It is certainly feeling better but remains quite ill.


02/20/2018 patient continues to improve.  Awaits her diet to be changed from 

clear liquids to at least a soft diet given the fact that she's been able to 

keep material down through the day today.  No further nausea or emesis.  Denies 

fevers or chills.  She's working diligently with her incentive spirometer to 

improve her pulmonary status and to get up and move around her room.  No other 

new complaints.





Objective





- Vital Signs


Vital signs: 


 Vital Signs











Temp  98.4 F   02/20/18 15:00


 


Pulse  68   02/20/18 19:20


 


Resp  18   02/20/18 15:00


 


BP  126/70   02/20/18 15:00


 


Pulse Ox  86 L  02/20/18 18:47








 Intake & Output











 02/20/18 02/20/18 02/21/18





 06:59 18:59 06:59


 


Intake Total 75 960 


 


Output Total 120 70 40


 


Balance -45 890 -40


 


Intake:   


 


  IV 75  


 


    Lactated Ringers 1,000 ml 75  





    @ 75 mls/hr IV .[id number]   





    CARYN Rx#:[id number]   


 


  Oral  960 


 


Output:   


 


  Drainage 120 70 40


 


    Right Abdomen 120 70 40


 


Other:   


 


  Voiding Method Bedside Commode Bedside Commode Bedside Commode





 Bedpan  


 


  # Voids 2 2 2














- Exam





Gen: This is a morbidly obese 63-year-old  female.  As related is 

extubated.  Is sitting upright and eating her meal.


HEENT: Head is atraumatic, normocephalic. Pupils equal, round. Sclerae is 

anicteric.  Oral ET and gastric tube in place. 


NECK: Supple. No JVD. No lymphadenopathy. No thyromegaly. 


LUNGS: Clear to auscultation. No wheezes or rhonchi.  Diminished at the bases.  

No intercostal retractions.


HEART: Regular rate and rhythm. No murmur. 


ABDOMEN: Soft. Bowel sounds are hypoactive. No masses.  Has distinct tenderness 

in the right upper quadrant on exam .AIDAN drain noted with serosanguineous 

drainage.  Puncture sites all appear to be clean but one in the epigastric area 

is covered with gauze secondary to serous drainage.


EXTREMITIES: No pedal edema. Dorsalis pedis +2 bilaterally.


NEUROLOGICAL: She is now awake alert oriented to person place and time without 

acute gross focal sensory motor deficits





- Labs


CBC & Chem 7: 


 02/18/18 04:57





 02/20/18 08:00


Labs: 


 Abnormal Lab Results - Last 24 Hours (Table)











  02/20/18 02/20/18 02/20/18 Range/Units





  06:56 08:00 11:21 


 


Chloride   92 L   ()  mmol/L


 


Carbon Dioxide   40 H*   (22-30)  mmol/L


 


BUN   29 H   (7-17)  mg/dL


 


Glucose   163 H   (74-99)  mg/dL


 


POC Glucose (mg/dL)  155 H   278 H  (75-99)  mg/dL


 


Phosphorus   4.7 H   (2.5-4.5)  mg/dL


 


ALT   75 H   (9-52)  U/L


 


Total Protein   5.6 L   (6.3-8.2)  g/dL


 


Albumin   3.1 L   (3.5-5.0)  g/dL














  02/20/18 02/20/18 Range/Units





  16:59 20:44 


 


Chloride    ()  mmol/L


 


Carbon Dioxide    (22-30)  mmol/L


 


BUN    (7-17)  mg/dL


 


Glucose    (74-99)  mg/dL


 


POC Glucose (mg/dL)  185 H  210 H  (75-99)  mg/dL


 


Phosphorus    (2.5-4.5)  mg/dL


 


ALT    (9-52)  U/L


 


Total Protein    (6.3-8.2)  g/dL


 


Albumin    (3.5-5.0)  g/dL








 Laboratory Results











WBC  14.0 k/uL (3.8-10.6)  H  02/18/18  04:57    


 


RBC  4.66 m/uL (3.80-5.40)   02/18/18  04:57    


 


Hgb  12.7 gm/dL (11.4-16.0)   02/18/18  04:57    


 


Hct  41.0 % (34.0-46.0)   02/18/18  04:57    


 


MCV  87.8 fL (80.0-100.0)   02/18/18  04:57    


 


MCH  27.3 pg (25.0-35.0)   02/18/18  04:57    


 


MCHC  31.1 g/dL (31.0-37.0)   02/18/18  04:57    


 


RDW  13.9 % (11.5-15.5)   02/18/18  04:57    


 


Plt Count  333 k/uL (150-450)   02/18/18  04:57    


 


Neutrophils %  89 %  02/18/18  04:57    


 


Lymphocytes %  5 %  02/18/18  04:57    


 


Monocytes %  4 %  02/18/18  04:57    


 


Eosinophils %  1 %  02/18/18  04:57    


 


Basophils %  0 %  02/18/18  04:57    


 


Neutrophils #  12.5 k/uL (1.3-7.7)  H  02/18/18  04:57    


 


Lymphocytes #  0.7 k/uL (1.0-4.8)  L  02/18/18  04:57    


 


Monocytes #  0.6 k/uL (0-1.0)   02/18/18  04:57    


 


Eosinophils #  0.1 k/uL (0-0.7)   02/18/18  04:57    


 


Basophils #  0.1 k/uL (0-0.2)   02/18/18  04:57    


 


Hypochromasia  Slight   02/18/18  04:57    


 


Sample Site  rrad   02/17/18  11:26    


 


ABG pH  7.47  (7.35-7.45)  H  02/17/18  11:26    


 


ABG pCO2  47 mmHg (35-45)  H  02/17/18  11:26    


 


ABG pO2  93 mmHg ()   02/17/18  11:26    


 


ABG HCO3  34 mmol/L (21-25)  H  02/17/18  11:26    


 


ABG Total CO2  36 mmol/L (19-24)  H  02/17/18  11:26    


 


ABG O2 Saturation  97.3 % (94-97)  H  02/17/18  11:26    


 


ABG Base Excess  10.5 mmol/L  02/17/18  11:26    


 


Rod Test  Yes   02/17/18  11:26    


 


FiO2  50 %  02/17/18  11:26    


 


Sodium  140 mmol/L (137-145)   02/20/18  08:00    


 


Potassium  4.7 mmol/L (3.5-5.1)   02/20/18  08:00    


 


Chloride  92 mmol/L ()  L  02/20/18  08:00    


 


Carbon Dioxide  40 mmol/L (22-30)  H*  02/20/18  08:00    


 


Anion Gap  8 mmol/L  02/20/18  08:00    


 


BUN  29 mg/dL (7-17)  H  02/20/18  08:00    


 


Creatinine  0.77 mg/dL (0.52-1.04)   02/20/18  08:00    


 


Est GFR (MDRD) Af Amer  >60  (>60 ml/min/1.73 sqM)   02/20/18  08:00    


 


Est GFR (MDRD) Non-Af  >60  (>60 ml/min/1.73 sqM)   02/20/18  08:00    


 


Glucose  163 mg/dL (74-99)  H  02/20/18  08:00    


 


POC Glucose (mg/dL)  210 mg/dL (75-99)  H  02/20/18  20:44    


 


POC Glu Operater Thelma Flores   02/20/18  20:44    


 


Estimated Ave Glu mg/dL  131   02/15/18  21:53    


 


Hemoglobin A1c  6.2 % (4.0-6.0)  H  02/15/18  21:53    


 


Calcium  9.4 mg/dL (8.4-10.2)   02/20/18  08:00    


 


Phosphorus  4.7 mg/dL (2.5-4.5)  H  02/20/18  08:00    


 


Magnesium  2.2 mg/dL (1.6-2.3)   02/20/18  08:00    


 


Total Bilirubin  0.5 mg/dL (0.2-1.3)   02/20/18  08:00    


 


AST  27 U/L (14-36)   02/20/18  08:00    


 


ALT  75 U/L (9-52)  H  02/20/18  08:00    


 


Alkaline Phosphatase  91 U/L ()   02/20/18  08:00    


 


Total Protein  5.6 g/dL (6.3-8.2)  L  02/20/18  08:00    


 


Albumin  3.1 g/dL (3.5-5.0)  L  02/20/18  08:00    


 


Lipase  33 U/L ()   02/13/18  16:30    


 


C. difficile Tox (PCR)  Not Detected  (Not Detectd)   02/18/18  15:10    


 


Influenza Type A RNA  Not Detected  (Not Detectd)   02/16/18  14:15    


 


Influenza Type B (PCR)  Not Detected  (Not Detectd)   02/16/18  14:15    














Assessment and Plan


(1) Acute cholecystitis


Narrative/Plan: 


This 63-year-old woman who suffers superobesity remains intubated and sedated 

with her respiratory failure after her emergent cholecystectomy for gangrenous 

cholecystitis.  Pulmonary critical care is working at attempts for extubation.  

Antimicrobial therapy was altered to meropenem to enhance deep penetration into 

the biliary tract given her sepsis.  Cultures will be monitored.  Leukocytosis 

from her sepsis from the gangrenous cholecystitis some improvement noted.


However is having ongoing difficulties with acute lung injury and steroids have 

been started and adjustments to her ventilatory settings in some improvement 

are noted.


 Continue supportive care and is receiving nutritional support also.


On 02/19/2018 the patient is showing further improvement of her sepsis.  Her 

respiratory failure has resolved.  She is feeling considerably better.  She 

will continue on current antibiotic therapy until she has further improvement 

and then transition plan can be made.


02/20/2018 the patient continues to have further improvement from her sepsis.  

She is now down to 3 L nasal cannula and continues to improve her pulmonary 

function.  As noted is diligently using her incentive spirometer to improve her 

pulmonary toilet.  Continue with current antibiotic therapy.  She is tolerating 

it well.  Surgery is following and hopefully can advance her diet.


Patient is receiving Solu-Medrol is likely the etiology of the current 

leukocytosis, no other new sources of infection are seen.


Current Visit: Yes   Status: Acute   Code(s): K81.0 - ACUTE CHOLECYSTITIS   

SNOMED Code(s): 47503079


   





(2) Acute hypoxemic respiratory failure


Current Visit: Yes   Status: Acute   Code(s): J96.01 - ACUTE RESPIRATORY 

FAILURE WITH HYPOXIA   SNOMED Code(s): 714369061


   





(3) Abdominal pain


Current Visit: Yes   Status: Acute   Code(s): R10.9 - UNSPECIFIED ABDOMINAL 

PAIN   SNOMED Code(s): 82250537


   





(4) Congestive heart failure


Current Visit: No   Status: Acute   Code(s): I50.9 - HEART FAILURE, UNSPECIFIED

   SNOMED Code(s): 15982485

## 2018-02-20 NOTE — P.PN
Subjective


Progress Note Date: 02/20/18


Principal diagnosis: 





sepsis








This is a 63-year-old  female that presented to [facility] emergency center on February 13 with abdominal pain and been going on 

for 1 week and gradually worsening along with nausea, a couple episodes of 

vomiting and abdominal distention.  She went to her primary care physician and 

was sent into the emergency center.  Patient was given morphine, Zofran and 2 L 

of fluid.  She underwent a CAT scan of the abdomen and pelvis with contrast 

that showed a fatty infiltration of the liver.  Acute and chronic cholecystitis

, gallbladder calculi in the gallbladder neck probable impacted stone in the 

gallbladder neck.  Patchy atelectasis in the lung bases more on the right and 

mild ascites.  On February 14, patient went for laparoscopic cholecystectomy 

with Dr. Trimble finding a gangrenous gallbladder.  Following the procedure, 

patient was extubated and then re-intubated and then admitted into intensive 

care unit consult was placed with Dr. Hutton who is following for intensive 

care management.  Patient presented with a temperature 100.2, white count of 

22.8 which is improved to 10.8.  Creatinine is 1.1.  Liver function tests of 

been elevated currently with AST of 160, , alkaline phosphatase 1:30, 

albumin 2.7.  Patient has not required vasopressors.  Her urine output has been 

adequate.  Repeat chest x-ray this morning shows probable heart failure with 

increased infiltrate or atelectasis in the lung bases.  Patient has been 

treated with IV antibiotics the form of Zosyn and Ancef as Jong and was changed 

to meropenem by ID.  Patient remains intubated at this time and appears 

comfortable on mechanical ventilation.


02/16/2018 patient remains in intensive care unit intubated sedated and 

mechanically ventilated.  On propofol she however is arousable and interactive.

  Continues to have respiratory failure with what appears to be an acute lung 

injury requiring PEEP of 12 which is allowed improvement of her oxygenation and 

hemodynamics.


On 02/19/2018 patient is improved.  She has been extubated.  Since on BiPAP 

overnight but as tolerated O2 by nasal cannula throughout the day.  She is 

eating some clear liquids without difficulty.  Has no other acute complaints.  

It is certainly feeling better but remains quite ill.





Objective





- Vital Signs


Vital signs: 


 Vital Signs











Temp  97.2 F L  02/19/18 23:00


 


Pulse  54 L  02/19/18 23:00


 


Resp  16   02/19/18 23:00


 


BP  122/70   02/19/18 23:00


 


Pulse Ox  96   02/19/18 23:00








 Intake & Output











 02/19/18 02/19/18 02/20/18





 06:59 18:59 06:59


 


Intake Total 875 1700 75


 


Output Total 845 1615 80


 


Balance 30 85 -5


 


Weight 133 kg 133 kg 


 


Intake:   


 


   1000 75


 


    Lactated Ringers 1,000 ml 675 900 75





    @ 75 mls/hr IV .[id number]   





    [facility] Rx#:[id number]   


 


    Meropenem 1 gm In Sodium 200 100 





    Chloride 0.9% 100 ml @   





    100 mls/hr IVPB Q8HR [facility]   





    Rx#:[id number]   


 


  Oral  700 


 


Output:   


 


  Drainage 70 50 80


 


    Right Abdomen 70 50 80


 


  Urine 775 1565 


 


Other:   


 


  Voiding Method Indwelling Catheter Indwelling Catheter Bedside Commode





   Bedpan


 


  # Voids 1  3


 


  # Bowel Movements  2 














- Exam





Gen: This is a morbidly obese 63-year-old  female.  As related is 

extubated.  Is sitting upright and eating her meal.


HEENT: Head is atraumatic, normocephalic. Pupils equal, round. Sclerae is 

anicteric.  Oral ET and gastric tube in place. 


NECK: Supple. No JVD. No lymphadenopathy. No thyromegaly. 


LUNGS: Clear to auscultation. No wheezes or rhonchi.  Diminished at the bases.  

No intercostal retractions.


HEART: Regular rate and rhythm. No murmur. 


ABDOMEN: Soft. Bowel sounds are hypoactive. No masses.  Has distinct tenderness 

in the right upper quadrant on exam .AIDAN drain noted with serosanguineous 

drainage.  Puncture sites all appear to be clean but one in the epigastric area 

is covered with gauze secondary to serous drainage.


EXTREMITIES: No pedal edema. Dorsalis pedis +2 bilaterally.


NEUROLOGICAL: She is now awake alert oriented to person place and time without 

acute gross focal sensory motor deficits





- Labs


CBC & Chem 7: 


 02/18/18 04:57





 02/19/18 03:55


Labs: 


 Abnormal Lab Results - Last 24 Hours (Table)











  02/19/18 02/19/18 02/19/18 Range/Units





  03:55 07:10 12:15 


 


Carbon Dioxide  34 H    (22-30)  mmol/L


 


BUN  34 H    (7-17)  mg/dL


 


Glucose  149 H    (74-99)  mg/dL


 


POC Glucose (mg/dL)   138 H  155 H  (75-99)  mg/dL


 


Magnesium  2.4 H    (1.6-2.3)  mg/dL


 


AST  51 H    (14-36)  U/L


 


ALT  93 H    (9-52)  U/L


 


Total Protein  5.8 L    (6.3-8.2)  g/dL


 


Albumin  3.1 L    (3.5-5.0)  g/dL














  02/19/18 02/19/18 Range/Units





  16:54 20:29 


 


Carbon Dioxide    (22-30)  mmol/L


 


BUN    (7-17)  mg/dL


 


Glucose    (74-99)  mg/dL


 


POC Glucose (mg/dL)  186 H  161 H  (75-99)  mg/dL


 


Magnesium    (1.6-2.3)  mg/dL


 


AST    (14-36)  U/L


 


ALT    (9-52)  U/L


 


Total Protein    (6.3-8.2)  g/dL


 


Albumin    (3.5-5.0)  g/dL








 Laboratory Results











WBC  14.0 k/uL (3.8-10.6)  H  02/18/18  04:57    


 


RBC  4.66 m/uL (3.80-5.40)   02/18/18  04:57    


 


Hgb  12.7 gm/dL (11.4-16.0)   02/18/18  04:57    


 


Hct  41.0 % (34.0-46.0)   02/18/18  04:57    


 


MCV  87.8 fL (80.0-100.0)   02/18/18  04:57    


 


MCH  27.3 pg (25.0-35.0)   02/18/18  04:57    


 


MCHC  31.1 g/dL (31.0-37.0)   02/18/18  04:57    


 


RDW  13.9 % (11.5-15.5)   02/18/18  04:57    


 


Plt Count  333 k/uL (150-450)   02/18/18  04:57    


 


Neutrophils %  89 %  02/18/18  04:57    


 


Lymphocytes %  5 %  02/18/18  04:57    


 


Monocytes %  4 %  02/18/18  04:57    


 


Eosinophils %  1 %  02/18/18  04:57    


 


Basophils %  0 %  02/18/18  04:57    


 


Neutrophils #  12.5 k/uL (1.3-7.7)  H  02/18/18  04:57    


 


Lymphocytes #  0.7 k/uL (1.0-4.8)  L  02/18/18  04:57    


 


Monocytes #  0.6 k/uL (0-1.0)   02/18/18  04:57    


 


Eosinophils #  0.1 k/uL (0-0.7)   02/18/18  04:57    


 


Basophils #  0.1 k/uL (0-0.2)   02/18/18  04:57    


 


Hypochromasia  Slight   02/18/18  04:57    


 


Sample Site  rrad   02/17/18  11:26    


 


ABG pH  7.47  (7.35-7.45)  H  02/17/18  11:26    


 


ABG pCO2  47 mmHg (35-45)  H  02/17/18  11:26    


 


ABG pO2  93 mmHg ()   02/17/18  11:26    


 


ABG HCO3  34 mmol/L (21-25)  H  02/17/18  11:26    


 


ABG Total CO2  36 mmol/L (19-24)  H  02/17/18  11:26    


 


ABG O2 Saturation  97.3 % (94-97)  H  02/17/18  11:26    


 


ABG Base Excess  10.5 mmol/L  02/17/18  11:26    


 


Rod Test  Yes   02/17/18  11:26    


 


FiO2  50 %  02/17/18  11:26    


 


Sodium  145 mmol/L (137-145)   02/19/18  03:55    


 


Potassium  4.8 mmol/L (3.5-5.1)   02/19/18  03:55    


 


Chloride  102 mmol/L ()   02/19/18  03:55    


 


Carbon Dioxide  34 mmol/L (22-30)  H  02/19/18  03:55    


 


Anion Gap  9 mmol/L  02/19/18  03:55    


 


BUN  34 mg/dL (7-17)  H  02/19/18  03:55    


 


Creatinine  0.60 mg/dL (0.52-1.04)   02/19/18  03:55    


 


Est GFR (MDRD) Af Amer  >60  (>60 ml/min/1.73 sqM)   02/19/18  03:55    


 


Est GFR (MDRD) Non-Af  >60  (>60 ml/min/1.73 sqM)   02/19/18  03:55    


 


Glucose  149 mg/dL (74-99)  H  02/19/18  03:55    


 


POC Glucose (mg/dL)  161 mg/dL (75-99)  H  02/19/18  20:29    


 


POC Glu Operater ID  Thelma Garcia   02/19/18  20:29    


 


Estimated Ave Glu mg/dL  131   02/15/18  21:53    


 


Hemoglobin A1c  6.2 % (4.0-6.0)  H  02/15/18  21:53    


 


Calcium  9.2 mg/dL (8.4-10.2)   02/19/18  03:55    


 


Phosphorus  4.1 mg/dL (2.5-4.5)   02/19/18  03:55    


 


Magnesium  2.4 mg/dL (1.6-2.3)  H  02/19/18  03:55    


 


Total Bilirubin  0.6 mg/dL (0.2-1.3)   02/19/18  03:55    


 


AST  51 U/L (14-36)  H  02/19/18  03:55    


 


ALT  93 U/L (9-52)  H  02/19/18  03:55    


 


Alkaline Phosphatase  87 U/L ()   02/19/18  03:55    


 


Total Protein  5.8 g/dL (6.3-8.2)  L  02/19/18  03:55    


 


Albumin  3.1 g/dL (3.5-5.0)  L  02/19/18  03:55    


 


Lipase  33 U/L ()   02/13/18  16:30    


 


C. difficile Tox (PCR)  Not Detected  (Not Detectd)   02/18/18  15:10    


 


Influenza Type A RNA  Not Detected  (Not Detectd)   02/16/18  14:15    


 


Influenza Type B (PCR)  Not Detected  (Not Detectd)   02/16/18  14:15    














Assessment and Plan


(1) Acute cholecystitis


Narrative/Plan: 


This 63-year-old woman who suffers superobesity remains intubated and sedated 

with her respiratory failure after her emergent cholecystectomy for gangrenous 

cholecystitis.  Pulmonary critical care is working at attempts for extubation.  

Antimicrobial therapy was altered to meropenem to enhance deep penetration into 

the biliary tract given her sepsis.  Cultures will be monitored.  Leukocytosis 

from her sepsis from the gangrenous cholecystitis some improvement noted.


However is having ongoing difficulties with acute lung injury and steroids have 

been started and adjustments to her ventilatory settings in some improvement 

are noted.


 Continue supportive care and is receiving nutritional support also.


On 02/19/2018 the patient is showing further improvement of her sepsis.  Her 

respiratory failure has resolved.  She is feeling considerably better.  She 

will continue on current antibiotic therapy until she has further improvement 

and then transition plan can be made.


Current Visit: Yes   Status: Acute   Code(s): K81.0 - ACUTE CHOLECYSTITIS   

SNOMED Code(s): 49476755


   





(2) Acute hypoxemic respiratory failure


Current Visit: Yes   Status: Acute   Code(s): J96.01 - ACUTE RESPIRATORY 

FAILURE WITH HYPOXIA   SNOMED Code(s): 908633370


   





(3) Abdominal pain


Current Visit: Yes   Status: Acute   Code(s): R10.9 - UNSPECIFIED ABDOMINAL 

PAIN   SNOMED Code(s): 61365960


   





(4) Congestive heart failure


Current Visit: No   Status: Acute   Code(s): I50.9 - HEART FAILURE, UNSPECIFIED

   SNOMED Code(s): 78912738

## 2018-02-21 LAB
ANION GAP SERPL CALC-SCNC: 10 MMOL/L
BASOPHILS # BLD AUTO: 0 K/UL (ref 0–0.2)
BASOPHILS NFR BLD AUTO: 0 %
BUN SERPL-SCNC: 29 MG/DL (ref 7–17)
CALCIUM SPEC-MCNC: 9.5 MG/DL (ref 8.4–10.2)
CHLORIDE SERPL-SCNC: 91 MMOL/L (ref 98–107)
CO2 SERPL-SCNC: 36 MMOL/L (ref 22–30)
EOSINOPHIL # BLD AUTO: 0 K/UL (ref 0–0.7)
EOSINOPHIL NFR BLD AUTO: 0 %
ERYTHROCYTE [DISTWIDTH] IN BLOOD BY AUTOMATED COUNT: 5.14 M/UL (ref 3.8–5.4)
ERYTHROCYTE [DISTWIDTH] IN BLOOD: 13.5 % (ref 11.5–15.5)
GLUCOSE BLD-MCNC: 158 MG/DL (ref 75–99)
GLUCOSE BLD-MCNC: 186 MG/DL (ref 75–99)
GLUCOSE BLD-MCNC: 211 MG/DL (ref 75–99)
GLUCOSE BLD-MCNC: 229 MG/DL (ref 75–99)
GLUCOSE SERPL-MCNC: 237 MG/DL (ref 74–99)
HCT VFR BLD AUTO: 45.3 % (ref 34–46)
HGB BLD-MCNC: 13.7 GM/DL (ref 11.4–16)
LYMPHOCYTES # SPEC AUTO: 0.6 K/UL (ref 1–4.8)
LYMPHOCYTES NFR SPEC AUTO: 3 %
MAGNESIUM SPEC-SCNC: 1.9 MG/DL (ref 1.6–2.3)
MCH RBC QN AUTO: 26.6 PG (ref 25–35)
MCHC RBC AUTO-ENTMCNC: 30.2 G/DL (ref 31–37)
MCV RBC AUTO: 88 FL (ref 80–100)
MONOCYTES # BLD AUTO: 0.4 K/UL (ref 0–1)
MONOCYTES NFR BLD AUTO: 2 %
NEUTROPHILS # BLD AUTO: 18 K/UL (ref 1.3–7.7)
NEUTROPHILS NFR BLD AUTO: 94 %
PLATELET # BLD AUTO: 384 K/UL (ref 150–450)
POTASSIUM SERPL-SCNC: 5 MMOL/L (ref 3.5–5.1)
SODIUM SERPL-SCNC: 137 MMOL/L (ref 137–145)
WBC # BLD AUTO: 19.1 K/UL (ref 3.8–10.6)

## 2018-02-21 RX ADMIN — MEROPENEM SCH MLS/HR: 1 INJECTION, POWDER, FOR SOLUTION INTRAVENOUS at 16:58

## 2018-02-21 RX ADMIN — PANTOPRAZOLE SODIUM SCH MG: 40 INJECTION, POWDER, FOR SOLUTION INTRAVENOUS at 07:51

## 2018-02-21 RX ADMIN — IPRATROPIUM BROMIDE SCH MG: 0.5 SOLUTION RESPIRATORY (INHALATION) at 15:47

## 2018-02-21 RX ADMIN — INSULIN ASPART SCH UNIT: 100 INJECTION, SOLUTION INTRAVENOUS; SUBCUTANEOUS at 07:49

## 2018-02-21 RX ADMIN — METHYLPREDNISOLONE SODIUM SUCCINATE SCH MG: 40 INJECTION, POWDER, FOR SOLUTION INTRAMUSCULAR; INTRAVENOUS at 16:59

## 2018-02-21 RX ADMIN — DOCUSATE SODIUM SCH: 100 CAPSULE, LIQUID FILLED ORAL at 07:50

## 2018-02-21 RX ADMIN — INSULIN ASPART SCH UNIT: 100 INJECTION, SOLUTION INTRAVENOUS; SUBCUTANEOUS at 17:54

## 2018-02-21 RX ADMIN — POTASSIUM CHLORIDE SCH MLS/HR: 14.9 INJECTION, SOLUTION INTRAVENOUS at 06:01

## 2018-02-21 RX ADMIN — METHYLPREDNISOLONE SODIUM SUCCINATE SCH MG: 40 INJECTION, POWDER, FOR SOLUTION INTRAMUSCULAR; INTRAVENOUS at 12:38

## 2018-02-21 RX ADMIN — MEROPENEM SCH MLS/HR: 1 INJECTION, POWDER, FOR SOLUTION INTRAVENOUS at 07:50

## 2018-02-21 RX ADMIN — ENOXAPARIN SODIUM SCH MG: 40 INJECTION SUBCUTANEOUS at 07:50

## 2018-02-21 RX ADMIN — Medication SCH UNIT: at 12:38

## 2018-02-21 RX ADMIN — DOCUSATE SODIUM SCH: 100 CAPSULE, LIQUID FILLED ORAL at 22:20

## 2018-02-21 RX ADMIN — IPRATROPIUM BROMIDE SCH MG: 0.5 SOLUTION RESPIRATORY (INHALATION) at 20:09

## 2018-02-21 RX ADMIN — FENOFIBRATE SCH MG: 160 TABLET ORAL at 07:51

## 2018-02-21 RX ADMIN — IPRATROPIUM BROMIDE SCH MG: 0.5 SOLUTION RESPIRATORY (INHALATION) at 09:17

## 2018-02-21 RX ADMIN — INSULIN ASPART SCH UNIT: 100 INJECTION, SOLUTION INTRAVENOUS; SUBCUTANEOUS at 22:21

## 2018-02-21 RX ADMIN — MEROPENEM SCH MLS/HR: 1 INJECTION, POWDER, FOR SOLUTION INTRAVENOUS at 23:36

## 2018-02-21 RX ADMIN — PRAVASTATIN SODIUM SCH MG: 20 TABLET ORAL at 07:51

## 2018-02-21 RX ADMIN — FUROSEMIDE SCH MG: 40 TABLET ORAL at 07:51

## 2018-02-21 RX ADMIN — POTASSIUM CHLORIDE SCH: 14.9 INJECTION, SOLUTION INTRAVENOUS at 03:40

## 2018-02-21 RX ADMIN — INSULIN ASPART SCH UNIT: 100 INJECTION, SOLUTION INTRAVENOUS; SUBCUTANEOUS at 12:38

## 2018-02-21 RX ADMIN — IPRATROPIUM BROMIDE SCH MG: 0.5 SOLUTION RESPIRATORY (INHALATION) at 13:44

## 2018-02-21 RX ADMIN — METHYLPREDNISOLONE SODIUM SUCCINATE SCH MG: 40 INJECTION, POWDER, FOR SOLUTION INTRAMUSCULAR; INTRAVENOUS at 06:12

## 2018-02-21 RX ADMIN — METHYLPREDNISOLONE SODIUM SUCCINATE SCH MG: 40 INJECTION, POWDER, FOR SOLUTION INTRAMUSCULAR; INTRAVENOUS at 23:36

## 2018-02-21 NOTE — P.PN
Subjective


Progress Note Date: 02/21/18





63-year-old female seen and examined sitting up on the edge of the bed.  

Patient states is tolerating a diet.  Passing gas no stool.  The O2 is being 

titrated down currently on 3 L patient states she continues to feel weak takes 

the assist of 1 to use a walker to get to the bathroom patient is interested in 

subacute rehab lives alone








 postop February 14 laparoscopic cholecystectomy for acute gangrenous 

cholecystitis, cholelithiasis.  Postoperatively patient needed to be 

reintubated due to acute hypoxic respiratory failure.














Objective





- Vital Signs


Vital signs: 


 Vital Signs











Temp  97.3 F L  02/21/18 06:29


 


Pulse  64   02/21/18 09:28


 


Resp  12   02/21/18 06:29


 


BP  106/69   02/21/18 06:29


 


Pulse Ox  84 L  02/21/18 10:54








 Intake & Output











 02/20/18 02/21/18 02/21/18





 18:59 06:59 18:59


 


Intake Total 960  


 


Output Total 70 40 70


 


Balance 890 -40 -70


 


Intake:   


 


  Oral 960  


 


Output:   


 


  Drainage 70 40 70


 


    Right Abdomen 70 40 70


 


Other:   


 


  Voiding Method Bedside Commode Bedside Commode Bedside Commode


 


  # Voids 2 1 1














- Exam





Physical exam


63-year-old female sitting on the as the bed oriented times to pleasant 

cooperative states breathing feels improved


Lungs posterior diminished at the bases no  wheezing noted nasal cannula 

titrated down to 2 L able to use IS achieve 1000 no cough noted 


Heart S1-S2 audible regular 


Abdomen obese soft AIDAN drain right lower quadrant serous sinus drainage no bowel 

movement states urinating no difficulty no nausea no vomiting tolerating diet 

surgical dressing sites dry few hypoactive bowel tones states passing gas


 extremities no edema to the lower extremities noted





- Labs


CBC & Chem 7: 


 02/21/18 09:28





 02/20/18 08:00


Labs: 


 Abnormal Lab Results - Last 24 Hours (Table)











  02/20/18 02/20/18 02/20/18 Range/Units





  11:21 16:59 20:44 


 


WBC     (3.8-10.6)  k/uL


 


MCHC     (31.0-37.0)  g/dL


 


Neutrophils #     (1.3-7.7)  k/uL


 


Lymphocytes #     (1.0-4.8)  k/uL


 


POC Glucose (mg/dL)  278 H  185 H  210 H  (75-99)  mg/dL














  02/21/18 02/21/18 Range/Units





  06:55 09:28 


 


WBC   19.1 H  (3.8-10.6)  k/uL


 


MCHC   30.2 L  (31.0-37.0)  g/dL


 


Neutrophils #   18.0 H  (1.3-7.7)  k/uL


 


Lymphocytes #   0.6 L  (1.0-4.8)  k/uL


 


POC Glucose (mg/dL)  158 H   (75-99)  mg/dL














Assessment and Plan


Assessment: 





Impression


Present on admission 3 day duration of right upper quadrant pain suspect due to 

an acute cholecystitis


CAT scan of the abdomen pelvis on admission showed evidence of cholelithiasis


Postop laparoscopic cholecystectomy due to an acute gangrenous cholecystitis 

done on February 14


Acute hypoxic respiratory failure unclear etiology needs to be urgently 

reintubated postoperatively


Morbid obesity BMI 44


Echocardiogram done February 15 no evidence of shunt no pulmonary hypertension 

left ventricular systolic function normal EF between 55 and 60%


Status post ALFIE with bubble study done on February 16 no evidence of a shunt


Chronic diastolic heart failure as noted on echocardiogram


Leukocytosis persists likely due to steroid therapy











Plan


PT OT increase activity as tolerated patient may be a candidate for subacute 

rehab


Continue recommendations pulmonary service attempt to titrate the O2 down keep 

the sats greater than 90%


Pain control


Continue postop surgical care


Increase activity


Continue recommendations by infectious disease


DVT and GI prophylaxis


Further surgical recommendations pending








Progress note dictated for Dr dr gilbert





The above impression and plan of care have been discussed and directed by 

signing physician. Carmelina Damico nurse practitioner acting as scribe for signing 

physician.

## 2018-02-21 NOTE — P.PN
Subjective


Progress Note Date: 02/21/18


Principal diagnosis: 


Postoperative hypoxic respiratory failure, related to bibasilar atelectasis, 

and underlying COPD.








This is a 63-year-old female, known history of COPD, obesity, hypertension, 

hyperlipidemia, history of cardiac arrhythmia in the form of atrial 

fibrillation requiring ablation, patient presented to the ER yesterday with 3 

days history of right upper quadrant pain.  CT of the abdomen and pelvis showed 

evidence of cholelithiasis and pericholecystic fluid.  Patient was seen by 

surgery on consultation, and she underwent laparoscopic cholecystectomy last 

night.  Post operatively, patient was extubated in the recovery room, however 

she had to be reintubated because of hypoxic respiratory failure.  Patient was 

sent to the ICU on mechanical ventilation, and I saw this morning she was still 

on relatively high FiO2 80%, PEEP of 5, and her ABG was rather marginal.  Her 

pO2 was 77 pCO2 was 49 pH of 7.39.  Initial ABG post intubation showed a pO2 of 

99 pCO2 of 51 and pH of 7.34.  The chest x-ray showed minimal bibasilar 

atelectasis, not severe enough to explain the degree of hypoxemia noted on the 

ABG.  Her O2 saturation on 80% was 94%, and as I went down to extubate percent, 

her O2 saturation remained basically about the same, 92%.  Considering that no 

change with dropping down the FiO2, I felt the patient may have to be evaluated 

for possible intracardiac right-to-left shunt.  Hence I recommended an urgent 

echocardiogram/contrast study to rule out an intracardiac shunt.  The patient 

is presently sedated, and not much history could be obtained from the patient.  

Chest x-ray and labs as well as medications were all reviewed.





Reevaluated today on 2/16/2018, patient remains on mechanical ventilation, 

sedated, on propofol.  Continues on relatively high FiO2 of 60%, PEEP is now up 

to 12, continues to have marginal saturations, and actually I'm not noticing 

much change in her oxygenation whether she is on 60% or 100%.  Hence I'm still 

suspecting that the patient has an intracardiac shunt.  Her echocardiogram was 

nondiagnostic, I discussed her condition with the cardiologist, and he may 

proceed with ALFIE to assess for possible shunt.  Her chest x-ray and CT of the 

chest are not impressive, no findings were noted to explain the profound degree 

of relative hypoxemia noted on the ABG.  Her ABG this morning on 60% and PEEP 

of 10 showed a pO2 of 65 pCO2 of 48 pH of 7.43.  CBC is relatively normal, 

basic metabolic profile is normal.  Liver enzymes are slightly elevated.  Her 

vent settings are tidal volume of 450, assist control rate of 24, FiO2 is 60%, 

PEEP is presently at 12.  Not much change was noted by increasing PEEP.  As a 

matter of fact not much changed noted with her oxygenation even with increasing 

her FiO2.  I plan to try the patient on 50% and see if there is any significant 

change.





Patient was reevaluated today on 2/17/2018, remains on mechanical ventilation, 

remains on a PEEP of 12, FiO2 was 60% earlier, but even cutting it down to 50% 

did not make much of a difference.  Patient was switched to a pressure support 

and SIMV mode of mechanical ventilation, for about one hour, then she was 

placed on CPAP and pressure support mode of mechanical ventilation.  After 1 

hour, her ABG was done and showed a pO2 of 93 pCO2 of 47 pH of 7.47.  Patient 

was hypoventilating, but that did not reflect on her pCO2 based on the ABG.  

Hence I recommended extubating the patient to BiPAP, and this was already at 

bedside.  Her chest x-ray is showing improvement.  Her ABG seems to be better 

overall, however I still believe the patient should be extubated to BiPAP 

because of the hypoventilatory status.  For her metabolic alkalosis, I believe 

it is mostly a contraction alkalosis, has the patient will be given some fluids.





Patient was reevaluated today on 2/18/2018, patient has been on nasal cannula 

since extubation yesterday, however she was noted to be slightly confused 

earlier today, and she was placed on BiPAP.  Saturating well on 40% FiO2 and 

BiPAP.  Patient is alert, oriented, does not seem to be in any form of 

distress.  Labs were reviewed including basic metabolic profile which is 

relatively normal, and CBC was normal.  Chest x-ray showed cardiomegaly and no 

evidence of any active disease.





Patient was reevaluated today on 2/19/2018, doing relatively well, on nasal 

cannula, in no distress.  Continues to have BiPAP at bedside, but apparently 

was not used today or last night.  It was used yesterday when she was noted to 

be confused admits.  Labs were reviewed basically unremarkable except for 

slightly elevated liver enzymes.  Renal profile is normal.





On 02/20/2018 patient seen again on medical surgical floor.  O2 is down to 3 L 

per nasal cannula, and did not wear her BiPAP mask last night.  She is awake, 

alert, denies any acute distress.  Lung sounds are active for scattered 

wheezes.  She remains afebrile, hemodynamically stable.  Her surgical pain is 

reasonably controlled.  She is compliant with her incentive spirometer, able to 

achieve 1500 on the today.  Abdominal incisions are clean dry and intact with 

approximated, AIDAN drain is present with moderate amount of serosanguineous 

drainage.  She is tolerating diet.  She has been up ambulating in the hallway, 

tolerated activity well.





On 02/21/2018 she seen in follow-up.  She is sitting up on the edge of the bed, 

she is doing very well.  Her IS effort is [id number] today.  She does qualify 

for home oxygen, she did desaturate to 84% on 2 L with ambulation.  Her home 

oxygen has already been arranged.  She denies any acute distress, lung sounds 

are clear to auscultation, no rhonchi, no wheezing or rales noted.  She will be 

started on the regular diet today, her abdominal incisions are clean dry and 

intact well approximated.  AIDAN drain on the right side of the abdomen remains 

with moderate amount of serosanguineous drainage.








Objective





- Vital Signs


Vital signs: 


 Vital Signs











Temp  97.3 F L  02/21/18 06:29


 


Pulse  64   02/21/18 09:28


 


Resp  12   02/21/18 06:29


 


BP  106/69   02/21/18 06:29


 


Pulse Ox  84 L  02/21/18 10:54








 Intake & Output











 02/20/18 02/21/18 02/21/18





 18:59 06:59 18:59


 


Intake Total 960  


 


Output Total 70 40 70


 


Balance 890 -40 -70


 


Intake:   


 


  Oral 960  


 


Output:   


 


  Drainage 70 40 70


 


    Right Abdomen 70 40 70


 


Other:   


 


  Voiding Method Bedside Commode Bedside Commode Bedside Commode


 


  # Voids 2 1 1














- Exam


Physical Exam: Revealed a 63-year-old female, obese, on nasal cannula, in no 

distress.


HEENT:[Neck is supple.] [No neck masses.] [No thyromegaly.] [No JVD.]  Moist 

mucous membranes.


Chest: Diminished breath sounds at the bases., no crackles, no rhonchi, no 

wheezes.]


Cardiac Exam: [Normal S1 and S2, no S3 gallop, no murmur.]


Abdomen: [Obese, postsurgical Soft, nontender,  no megaly, no rebound, no 

guarding, diminished bowel sounds.  Incisions clean dry and intact, well 

approximated.  AIDAN drainage present with moderate amount of serosanguineous 

drainage]


Extremities: [No clubbing, no edema, no cyanosis.]


Neurological Exam: [No gross focal neurologic deficit was noted when she was 

off sedation.


Psychiatric: Normal mood and affect, normal mental status examination noted.


Lymphatics: No lymphadenopathy was appreciated.








- Labs


CBC & Chem 7: 


 02/21/18 09:28





 02/21/18 09:28


Labs: 


 Abnormal Lab Results - Last 24 Hours (Table)











  02/20/18 02/20/18 02/21/18 Range/Units





  16:59 20:44 06:55 


 


WBC     (3.8-10.6)  k/uL


 


MCHC     (31.0-37.0)  g/dL


 


Neutrophils #     (1.3-7.7)  k/uL


 


Lymphocytes #     (1.0-4.8)  k/uL


 


Chloride     ()  mmol/L


 


Carbon Dioxide     (22-30)  mmol/L


 


BUN     (7-17)  mg/dL


 


Glucose     (74-99)  mg/dL


 


POC Glucose (mg/dL)  185 H  210 H  158 H  (75-99)  mg/dL














  02/21/18 02/21/18 02/21/18 Range/Units





  09:28 09:28 11:34 


 


WBC   19.1 H   (3.8-10.6)  k/uL


 


MCHC   30.2 L   (31.0-37.0)  g/dL


 


Neutrophils #   18.0 H   (1.3-7.7)  k/uL


 


Lymphocytes #   0.6 L   (1.0-4.8)  k/uL


 


Chloride  91 L    ()  mmol/L


 


Carbon Dioxide  36 H    (22-30)  mmol/L


 


BUN  29 H    (7-17)  mg/dL


 


Glucose  237 H    (74-99)  mg/dL


 


POC Glucose (mg/dL)    186 H  (75-99)  mg/dL














Assessment and Plan


Plan: 


Assessment:





1 Postoperative hypoxic respiratory failure, unexpected, most likely related to 

bibasilar atelectasis, underlying COPD,  Patient was extubated on 2/17/2019, 

and tolerated the extubation well, however intermittently he may require BiPAP.





3 history of multiple comorbidities including obesity, benign essential 

hypertension, COPD, hypertriglyceridemia.





Recommendation: 





Continue encouraging ambulation, incentive spirometry use, home oxygen has been 

arranged.  Patient is doing very well, lung sounds are clear, vital signs are 

stable, continue with current medical treatment.  Nebulized treatments.  From 

pulmonary standpoint she is stable for discharge once cleared by surgery.  She 

will need follow-up in the office with Dr. Teague in 7-10 days.





I performed a history & physical examination of the patient and discussed their 

management with my nurse practitioner, Montserrat Mauricio.  I reviewed the nurse 

practitioner's note and agree with the documented findings and plan of care.  

Lung sounds are positive for some scattered wheezes throughout the lung fields.

  The findings and the impression was discussed with the patient.  I attest to 

the documentation by the nurse practitioner. 





Time with Patient: Less than 30

## 2018-02-21 NOTE — P.PN
Subjective


Progress Note Date: 02/21/18


Principal diagnosis: 





sepsis








This is a 63-year-old  female that presented to [facility] emergency center on February 13 with abdominal pain and been going on 

for 1 week and gradually worsening along with nausea, a couple episodes of 

vomiting and abdominal distention.  She went to her primary care physician and 

was sent into the emergency center.  Patient was given morphine, Zofran and 2 L 

of fluid.  She underwent a CAT scan of the abdomen and pelvis with contrast 

that showed a fatty infiltration of the liver.  Acute and chronic cholecystitis

, gallbladder calculi in the gallbladder neck probable impacted stone in the 

gallbladder neck.  Patchy atelectasis in the lung bases more on the right and 

mild ascites.  On February 14, patient went for laparoscopic cholecystectomy 

with Dr. Trimble finding a gangrenous gallbladder.  Following the procedure, 

patient was extubated and then re-intubated and then admitted into intensive 

care unit consult was placed with Dr. Hutton who is following for intensive 

care management.  Patient presented with a temperature 100.2, white count of 

22.8 which is improved to 10.8.  Creatinine is 1.1.  Liver function tests of 

been elevated currently with AST of 160, , alkaline phosphatase 1:30, 

albumin 2.7.  Patient has not required vasopressors.  Her urine output has been 

adequate.  Repeat chest x-ray this morning shows probable heart failure with 

increased infiltrate or atelectasis in the lung bases.  Patient has been 

treated with IV antibiotics the form of Zosyn and Ancef as Jong and was changed 

to meropenem by ID.  Patient remains intubated at this time and appears 

comfortable on mechanical ventilation.


02/16/2018 patient remains in intensive care unit intubated sedated and 

mechanically ventilated.  On propofol she however is arousable and interactive.

  Continues to have respiratory failure with what appears to be an acute lung 

injury requiring PEEP of 12 which is allowed improvement of her oxygenation and 

hemodynamics.


On 02/19/2018 patient is improved.  She has been extubated.  Since on BiPAP 

overnight but as tolerated O2 by nasal cannula throughout the day.  She is 

eating some clear liquids without difficulty.  Has no other acute complaints.  

It is certainly feeling better but remains quite ill.


02/20/2018 patient continues to improve.  Awaits her diet to be changed from 

clear liquids to at least a soft diet given the fact that she's been able to 

keep material down through the day today.  No further nausea or emesis.  Denies 

fevers or chills.  She's working diligently with her incentive spirometer to 

improve her pulmonary status and to get up and move around her room.  No other 

new complaints.


02/21/2018 reveals the patient to have ongoing improvement, is only at 2 L 

nasal cannula, but does qualify for home O2 therapy.  Is still somewhat weak 

and contemplating physical therapy and rehab.  Shortness of breath is improved 

no other new acute complaints.  No fevers or chills.





Objective





- Vital Signs


Vital signs: 


 Vital Signs











Temp  97.2 F L  02/21/18 14:51


 


Pulse  84   02/21/18 20:18


 


Resp  16   02/21/18 14:51


 


BP  148/68   02/21/18 14:51


 


Pulse Ox  93 L  02/21/18 14:51








 Intake & Output











 02/21/18 02/21/18 02/22/18





 06:59 18:59 06:59


 


Intake Total  960 


 


Output Total 40 110 200


 


Balance -40 850 -200


 


Intake:   


 


  Oral  960 


 


Output:   


 


  Drainage 40 110 


 


    Right Abdomen 40 110 


 


  Urine   200


 


Other:   


 


  Voiding Method Bedside Commode Bedside Commode 


 


  # Voids 1 1 1














- Exam





Gen: This is a morbidly obese 63-year-old  female.  As related is 

extubated.  Is sitting upright and eating her meal.


HEENT: Head is atraumatic, normocephalic. Pupils equal, round. Sclerae is 

anicteric.  Oral ET and gastric tube in place. 


NECK: Supple. No JVD. No lymphadenopathy. No thyromegaly. 


LUNGS: Clear to auscultation. No wheezes or rhonchi.  Diminished at the bases.  

No intercostal retractions.


HEART: Regular rate and rhythm. No murmur. 


ABDOMEN: Soft. Bowel sounds are hypoactive. No masses.  Has distinct tenderness 

in the right upper quadrant on exam .AIDAN drain noted with serosanguineous 

drainage.  Puncture sites all appear to be clean but one in the epigastric area 

is covered with gauze secondary to serous drainage.


EXTREMITIES: No pedal edema. Dorsalis pedis +2 bilaterally.


NEUROLOGICAL: She is now awake alert oriented to person place and time without 

acute gross focal sensory motor deficits





- Labs


CBC & Chem 7: 


 02/21/18 09:28





 02/21/18 09:28


Labs: 


 Abnormal Lab Results - Last 24 Hours (Table)











  02/21/18 02/21/18 02/21/18 Range/Units





  06:55 09:28 09:28 


 


WBC    19.1 H  (3.8-10.6)  k/uL


 


MCHC    30.2 L  (31.0-37.0)  g/dL


 


Neutrophils #    18.0 H  (1.3-7.7)  k/uL


 


Lymphocytes #    0.6 L  (1.0-4.8)  k/uL


 


Chloride   91 L   ()  mmol/L


 


Carbon Dioxide   36 H   (22-30)  mmol/L


 


BUN   29 H   (7-17)  mg/dL


 


Glucose   237 H   (74-99)  mg/dL


 


POC Glucose (mg/dL)  158 H    (75-99)  mg/dL














  02/21/18 02/21/18 02/21/18 Range/Units





  11:34 17:05 20:38 


 


WBC     (3.8-10.6)  k/uL


 


MCHC     (31.0-37.0)  g/dL


 


Neutrophils #     (1.3-7.7)  k/uL


 


Lymphocytes #     (1.0-4.8)  k/uL


 


Chloride     ()  mmol/L


 


Carbon Dioxide     (22-30)  mmol/L


 


BUN     (7-17)  mg/dL


 


Glucose     (74-99)  mg/dL


 


POC Glucose (mg/dL)  186 H  211 H  229 H  (75-99)  mg/dL








 Laboratory Results











WBC  19.1 k/uL (3.8-10.6)  H  02/21/18  09:28    


 


RBC  5.14 m/uL (3.80-5.40)   02/21/18  09:28    


 


Hgb  13.7 gm/dL (11.4-16.0)   02/21/18  09:28    


 


Hct  45.3 % (34.0-46.0)   02/21/18  09:28    


 


MCV  88.0 fL (80.0-100.0)   02/21/18  09:28    


 


MCH  26.6 pg (25.0-35.0)   02/21/18  09:28    


 


MCHC  30.2 g/dL (31.0-37.0)  L  02/21/18  09:28    


 


RDW  13.5 % (11.5-15.5)   02/21/18  09:28    


 


Plt Count  384 k/uL (150-450)   02/21/18  09:28    


 


Neutrophils %  94 %  02/21/18  09:28    


 


Lymphocytes %  3 %  02/21/18  09:28    


 


Monocytes %  2 %  02/21/18  09:28    


 


Eosinophils %  0 %  02/21/18  09:28    


 


Basophils %  0 %  02/21/18  09:28    


 


Neutrophils #  18.0 k/uL (1.3-7.7)  H  02/21/18  09:28    


 


Lymphocytes #  0.6 k/uL (1.0-4.8)  L  02/21/18  09:28    


 


Monocytes #  0.4 k/uL (0-1.0)   02/21/18  09:28    


 


Eosinophils #  0.0 k/uL (0-0.7)   02/21/18  09:28    


 


Basophils #  0.0 k/uL (0-0.2)   02/21/18  09:28    


 


Hypochromasia  Slight   02/21/18  09:28    


 


Sample Site  rrad   02/17/18  11:26    


 


ABG pH  7.47  (7.35-7.45)  H  02/17/18  11:26    


 


ABG pCO2  47 mmHg (35-45)  H  02/17/18  11:26    


 


ABG pO2  93 mmHg ()   02/17/18  11:26    


 


ABG HCO3  34 mmol/L (21-25)  H  02/17/18  11:26    


 


ABG Total CO2  36 mmol/L (19-24)  H  02/17/18  11:26    


 


ABG O2 Saturation  97.3 % (94-97)  H  02/17/18  11:26    


 


ABG Base Excess  10.5 mmol/L  02/17/18  11:26    


 


Rod Test  Yes   02/17/18  11:26    


 


FiO2  50 %  02/17/18  11:26    


 


Sodium  137 mmol/L (137-145)   02/21/18  09:28    


 


Potassium  5.0 mmol/L (3.5-5.1)   02/21/18  09:28    


 


Chloride  91 mmol/L ()  L  02/21/18  09:28    


 


Carbon Dioxide  36 mmol/L (22-30)  H  02/21/18  09:28    


 


Anion Gap  10 mmol/L  02/21/18  09:28    


 


BUN  29 mg/dL (7-17)  H  02/21/18  09:28    


 


Creatinine  0.81 mg/dL (0.52-1.04)   02/21/18  09:28    


 


Est GFR (MDRD) Af Amer  >60  (>60 ml/min/1.73 sqM)   02/21/18  09:28    


 


Est GFR (MDRD) Non-Af  >60  (>60 ml/min/1.73 sqM)   02/21/18  09:28    


 


Glucose  237 mg/dL (74-99)  H  02/21/18  09:28    


 


POC Glucose (mg/dL)  229 mg/dL (75-99)  H  02/21/18  20:38    


 


POC Glu Operater ID  Cherelle Martin   02/21/18  20:38    


 


Estimated Ave Glu mg/dL  131   02/15/18  21:53    


 


Hemoglobin A1c  6.2 % (4.0-6.0)  H  02/15/18  21:53    


 


Calcium  9.5 mg/dL (8.4-10.2)   02/21/18  09:28    


 


Phosphorus  3.5 mg/dL (2.5-4.5)   02/21/18  09:28    


 


Magnesium  1.9 mg/dL (1.6-2.3)   02/21/18  09:28    


 


Total Bilirubin  0.5 mg/dL (0.2-1.3)   02/20/18  08:00    


 


AST  27 U/L (14-36)   02/20/18  08:00    


 


ALT  75 U/L (9-52)  H  02/20/18  08:00    


 


Alkaline Phosphatase  91 U/L ()   02/20/18  08:00    


 


Total Protein  5.6 g/dL (6.3-8.2)  L  02/20/18  08:00    


 


Albumin  3.1 g/dL (3.5-5.0)  L  02/20/18  08:00    


 


Lipase  33 U/L ()   02/13/18  16:30    


 


C. difficile Tox (PCR)  Not Detected  (Not Detectd)   02/18/18  15:10    


 


Influenza Type A RNA  Not Detected  (Not Detectd)   02/16/18  14:15    


 


Influenza Type B (PCR)  Not Detected  (Not Detectd)   02/16/18  14:15    














Assessment and Plan


(1) Acute cholecystitis


Narrative/Plan: 


This 63-year-old woman who suffers superobesity remains intubated and sedated 

with her respiratory failure after her emergent cholecystectomy for gangrenous 

cholecystitis.  Pulmonary critical care is working at attempts for extubation.  

Antimicrobial therapy was altered to meropenem to enhance deep penetration into 

the biliary tract given her sepsis.  Cultures will be monitored.  Leukocytosis 

from her sepsis from the gangrenous cholecystitis some improvement noted.


However is having ongoing difficulties with acute lung injury and steroids have 

been started and adjustments to her ventilatory settings in some improvement 

are noted.


 Continue supportive care and is receiving nutritional support also.


On 02/19/2018 the patient is showing further improvement of her sepsis.  Her 

respiratory failure has resolved.  She is feeling considerably better.  She 

will continue on current antibiotic therapy until she has further improvement 

and then transition plan can be made.


02/20/2018 the patient continues to have further improvement from her sepsis.  

She is now down to 3 L nasal cannula and continues to improve her pulmonary 

function.  As noted is diligently using her incentive spirometer to improve her 

pulmonary toilet.  Continue with current antibiotic therapy.  She is tolerating 

it well.  Surgery is following and hopefully can advance her diet.


Patient is receiving Solu-Medrol is likely the etiology of the current 

leukocytosis, no other new sources of infection are seen.


02/21/2018 patient has ongoing improvement.  She is at 2 L nasal cannula and 

much less short of breath.  Continues with her incentive spirometer and 

antibiotic therapy.  She's had a significant improvement of her status.  

Leukocytosis is occurring with the current steroid therapy for her lung disease 

and is noted pulmonary function is improving.  Drainage from the AIDAN drain is 

improved.  As she has further improvement will be able to migrate antibiotic 

therapy to oral ciprofloxacin 500 mg every 12 hours and metronidazole 500 mg 

every 8 hours for a 7 day course to complete the treatment of her sepsis the 

biliary tract and her exacerbation of her COPD.  Steroid taper as per pulmonary 

medicine


Current Visit: Yes   Status: Acute   Code(s): K81.0 - ACUTE CHOLECYSTITIS   

SNOMED Code(s): 22659507


   





(2) Acute hypoxemic respiratory failure


Current Visit: Yes   Status: Acute   Code(s): J96.01 - ACUTE RESPIRATORY 

FAILURE WITH HYPOXIA   SNOMED Code(s): 155105361


   





(3) Abdominal pain


Current Visit: Yes   Status: Acute   Code(s): R10.9 - UNSPECIFIED ABDOMINAL 

PAIN   SNOMED Code(s): 05622527


   





(4) Congestive heart failure


Current Visit: No   Status: Acute   Code(s): I50.9 - HEART FAILURE, UNSPECIFIED

   SNOMED Code(s): 07460679

## 2018-02-22 VITALS — DIASTOLIC BLOOD PRESSURE: 66 MMHG | SYSTOLIC BLOOD PRESSURE: 117 MMHG | RESPIRATION RATE: 16 BRPM | TEMPERATURE: 97.7 F

## 2018-02-22 VITALS — HEART RATE: 84 BPM

## 2018-02-22 LAB
ANION GAP SERPL CALC-SCNC: 7 MMOL/L
BASOPHILS # BLD AUTO: 0 K/UL (ref 0–0.2)
BASOPHILS NFR BLD AUTO: 0 %
BUN SERPL-SCNC: 35 MG/DL (ref 7–17)
CALCIUM SPEC-MCNC: 9.2 MG/DL (ref 8.4–10.2)
CHLORIDE SERPL-SCNC: 93 MMOL/L (ref 98–107)
CO2 SERPL-SCNC: 37 MMOL/L (ref 22–30)
EOSINOPHIL # BLD AUTO: 0 K/UL (ref 0–0.7)
EOSINOPHIL NFR BLD AUTO: 0 %
ERYTHROCYTE [DISTWIDTH] IN BLOOD BY AUTOMATED COUNT: 5.23 M/UL (ref 3.8–5.4)
ERYTHROCYTE [DISTWIDTH] IN BLOOD: 13.5 % (ref 11.5–15.5)
GLUCOSE BLD-MCNC: 160 MG/DL (ref 75–99)
GLUCOSE BLD-MCNC: 173 MG/DL (ref 75–99)
GLUCOSE SERPL-MCNC: 139 MG/DL (ref 74–99)
HCT VFR BLD AUTO: 45.6 % (ref 34–46)
HGB BLD-MCNC: 14.1 GM/DL (ref 11.4–16)
LYMPHOCYTES # SPEC AUTO: 0.5 K/UL (ref 1–4.8)
LYMPHOCYTES NFR SPEC AUTO: 3 %
MCH RBC QN AUTO: 26.9 PG (ref 25–35)
MCHC RBC AUTO-ENTMCNC: 30.9 G/DL (ref 31–37)
MCV RBC AUTO: 87.2 FL (ref 80–100)
MONOCYTES # BLD AUTO: 0.5 K/UL (ref 0–1)
MONOCYTES NFR BLD AUTO: 3 %
NEUTROPHILS # BLD AUTO: 17 K/UL (ref 1.3–7.7)
NEUTROPHILS NFR BLD AUTO: 94 %
PLATELET # BLD AUTO: 369 K/UL (ref 150–450)
POTASSIUM SERPL-SCNC: 5.6 MMOL/L (ref 3.5–5.1)
SODIUM SERPL-SCNC: 137 MMOL/L (ref 137–145)
WBC # BLD AUTO: 18.1 K/UL (ref 3.8–10.6)

## 2018-02-22 RX ADMIN — DOCUSATE SODIUM SCH MG: 100 CAPSULE, LIQUID FILLED ORAL at 08:05

## 2018-02-22 RX ADMIN — IPRATROPIUM BROMIDE SCH MG: 0.5 SOLUTION RESPIRATORY (INHALATION) at 08:21

## 2018-02-22 RX ADMIN — FENOFIBRATE SCH MG: 160 TABLET ORAL at 08:05

## 2018-02-22 RX ADMIN — Medication SCH UNIT: at 11:27

## 2018-02-22 RX ADMIN — INSULIN ASPART SCH UNIT: 100 INJECTION, SOLUTION INTRAVENOUS; SUBCUTANEOUS at 08:04

## 2018-02-22 RX ADMIN — IPRATROPIUM BROMIDE SCH MG: 0.5 SOLUTION RESPIRATORY (INHALATION) at 11:58

## 2018-02-22 RX ADMIN — FUROSEMIDE SCH MG: 40 TABLET ORAL at 08:06

## 2018-02-22 RX ADMIN — MEROPENEM SCH MLS/HR: 1 INJECTION, POWDER, FOR SOLUTION INTRAVENOUS at 08:12

## 2018-02-22 RX ADMIN — METHYLPREDNISOLONE SODIUM SUCCINATE SCH MG: 40 INJECTION, POWDER, FOR SOLUTION INTRAMUSCULAR; INTRAVENOUS at 05:42

## 2018-02-22 RX ADMIN — ENOXAPARIN SODIUM SCH MG: 40 INJECTION SUBCUTANEOUS at 08:05

## 2018-02-22 RX ADMIN — PRAVASTATIN SODIUM SCH MG: 20 TABLET ORAL at 08:06

## 2018-02-22 RX ADMIN — INSULIN ASPART SCH UNIT: 100 INJECTION, SOLUTION INTRAVENOUS; SUBCUTANEOUS at 12:31

## 2018-02-22 NOTE — P.PN
Subjective


Progress Note Date: 02/21/18


Progress note being dictated for Dr. Dias.











Interval history: This is a 63-year-old female status post laparoscopic 

cholecystectomy for gangrenous cholecystitis, postoperative hypoxic respiratory 

failure and multiple other medical issues.  Passing flatus, no bowel movement.  

Tolerating diet, denies nausea vomiting.  Ambulating with walker in room.  

Incentive spirometer up to 1000.  Complains of fatigue.








Objective





- Vital Signs


Vital signs: 


 Vital Signs











Temp  97.2 F L  02/21/18 14:51


 


Pulse  84   02/21/18 16:03


 


Resp  16   02/21/18 14:51


 


BP  148/68   02/21/18 14:51


 


Pulse Ox  93 L  02/21/18 14:51








 Intake & Output











 02/20/18 02/21/18 02/21/18





 18:59 06:59 18:59


 


Intake Total 960  960


 


Output Total 70 40 110


 


Balance 890 -40 850


 


Intake:   


 


  Oral 960  960


 


Output:   


 


  Drainage 70 40 110


 


    Right Abdomen 70 40 110


 


Other:   


 


  Voiding Method Bedside Commode Bedside Commode Bedside Commode


 


  # Voids 2 1 1














- Exam





PHYSICAL EXAM:


VITAL SIGNS: [As above]


GENERAL: Sitting up in bed, no acute distress


HEENT:  Conjunctivae normal. eyes normal.  Oral mucosa moist


NECK:  No JVD. No thyroid enlargement. No LNs


CARDIOVASCULAR:  S1, S2 muffled. No murmur


RESPIRATION: Breath sounds diminished in the bases. No rhonchi or crackles. 


ABDOMEN:  Soft, status post laparoscopic surgery, AIDAN drain present with 

serosanguineous drainage.hypoactive Bowel sounds heard.


LEGS:  No edema. no swelling 


PSYCHIATRY: Alert and oriented -3, mood and affect normal.


NERVOUS SYSTEM:  Cranial N 2-12 grossly normal. Moves all 4 limbs.  Diffuse 

weakness No focal deficits.











- Labs


CBC & Chem 7: 


 02/22/18 07:56





 02/22/18 07:56


Labs: 


 Abnormal Lab Results - Last 24 Hours (Table)











  02/20/18 02/21/18 02/21/18 Range/Units





  20:44 06:55 09:28 


 


WBC     (3.8-10.6)  k/uL


 


MCHC     (31.0-37.0)  g/dL


 


Neutrophils #     (1.3-7.7)  k/uL


 


Lymphocytes #     (1.0-4.8)  k/uL


 


Chloride    91 L  ()  mmol/L


 


Carbon Dioxide    36 H  (22-30)  mmol/L


 


BUN    29 H  (7-17)  mg/dL


 


Glucose    237 H  (74-99)  mg/dL


 


POC Glucose (mg/dL)  210 H  158 H   (75-99)  mg/dL














  02/21/18 02/21/18 02/21/18 Range/Units





  09:28 11:34 17:05 


 


WBC  19.1 H    (3.8-10.6)  k/uL


 


MCHC  30.2 L    (31.0-37.0)  g/dL


 


Neutrophils #  18.0 H    (1.3-7.7)  k/uL


 


Lymphocytes #  0.6 L    (1.0-4.8)  k/uL


 


Chloride     ()  mmol/L


 


Carbon Dioxide     (22-30)  mmol/L


 


BUN     (7-17)  mg/dL


 


Glucose     (74-99)  mg/dL


 


POC Glucose (mg/dL)   186 H  211 H  (75-99)  mg/dL














Assessment and Plan


Assessment: 


1.  Status post laparoscopic cholecystectomy for gangrenous cholecystitis with 

AIDAN drain





2.  Acute hypoxic respiratory failure possibly secondary to underlying COPD 

acute exacerbation, status post extubation





3.  Postoperative acute hypoxemic respiratory failure





4.  Morbid obesity, BMI 45.9














Plan: Continue on current medication regime ,monitoring and symptomatic 

treatment.  Maintain nebulized buckled dilators, empiric antibiotics.  Increase 

ambulation as tolerated.  Aggressive pulmonary toileting with incentive 

spirometer reinforced.  Further recommendations to follow.


























The impression and plan of care has been dictated as directed.





:


I performed a history and examination of this patient,  discussed the same with 

the dictator.  I agree with the dictator's note ,documented as a scribe.  Any 

additional findings or plans will be noted.

## 2018-02-22 NOTE — P.PN
Subjective


Progress Note Date: 02/22/18


Principal diagnosis: 


Postoperative hypoxic respiratory failure, related to bibasilar atelectasis, 

and underlying COPD.








This is a 63-year-old female, known history of COPD, obesity, hypertension, 

hyperlipidemia, history of cardiac arrhythmia in the form of atrial 

fibrillation requiring ablation, patient presented to the ER yesterday with 3 

days history of right upper quadrant pain.  CT of the abdomen and pelvis showed 

evidence of cholelithiasis and pericholecystic fluid.  Patient was seen by 

surgery on consultation, and she underwent laparoscopic cholecystectomy last 

night.  Post operatively, patient was extubated in the recovery room, however 

she had to be reintubated because of hypoxic respiratory failure.  Patient was 

sent to the ICU on mechanical ventilation, and I saw this morning she was still 

on relatively high FiO2 80%, PEEP of 5, and her ABG was rather marginal.  Her 

pO2 was 77 pCO2 was 49 pH of 7.39.  Initial ABG post intubation showed a pO2 of 

99 pCO2 of 51 and pH of 7.34.  The chest x-ray showed minimal bibasilar 

atelectasis, not severe enough to explain the degree of hypoxemia noted on the 

ABG.  Her O2 saturation on 80% was 94%, and as I went down to extubate percent, 

her O2 saturation remained basically about the same, 92%.  Considering that no 

change with dropping down the FiO2, I felt the patient may have to be evaluated 

for possible intracardiac right-to-left shunt.  Hence I recommended an urgent 

echocardiogram/contrast study to rule out an intracardiac shunt.  The patient 

is presently sedated, and not much history could be obtained from the patient.  

Chest x-ray and labs as well as medications were all reviewed.





Reevaluated today on 2/16/2018, patient remains on mechanical ventilation, 

sedated, on propofol.  Continues on relatively high FiO2 of 60%, PEEP is now up 

to 12, continues to have marginal saturations, and actually I'm not noticing 

much change in her oxygenation whether she is on 60% or 100%.  Hence I'm still 

suspecting that the patient has an intracardiac shunt.  Her echocardiogram was 

nondiagnostic, I discussed her condition with the cardiologist, and he may 

proceed with ALFIE to assess for possible shunt.  Her chest x-ray and CT of the 

chest are not impressive, no findings were noted to explain the profound degree 

of relative hypoxemia noted on the ABG.  Her ABG this morning on 60% and PEEP 

of 10 showed a pO2 of 65 pCO2 of 48 pH of 7.43.  CBC is relatively normal, 

basic metabolic profile is normal.  Liver enzymes are slightly elevated.  Her 

vent settings are tidal volume of 450, assist control rate of 24, FiO2 is 60%, 

PEEP is presently at 12.  Not much change was noted by increasing PEEP.  As a 

matter of fact not much changed noted with her oxygenation even with increasing 

her FiO2.  I plan to try the patient on 50% and see if there is any significant 

change.





Patient was reevaluated today on 2/17/2018, remains on mechanical ventilation, 

remains on a PEEP of 12, FiO2 was 60% earlier, but even cutting it down to 50% 

did not make much of a difference.  Patient was switched to a pressure support 

and SIMV mode of mechanical ventilation, for about one hour, then she was 

placed on CPAP and pressure support mode of mechanical ventilation.  After 1 

hour, her ABG was done and showed a pO2 of 93 pCO2 of 47 pH of 7.47.  Patient 

was hypoventilating, but that did not reflect on her pCO2 based on the ABG.  

Hence I recommended extubating the patient to BiPAP, and this was already at 

bedside.  Her chest x-ray is showing improvement.  Her ABG seems to be better 

overall, however I still believe the patient should be extubated to BiPAP 

because of the hypoventilatory status.  For her metabolic alkalosis, I believe 

it is mostly a contraction alkalosis, has the patient will be given some fluids.





Patient was reevaluated today on 2/18/2018, patient has been on nasal cannula 

since extubation yesterday, however she was noted to be slightly confused 

earlier today, and she was placed on BiPAP.  Saturating well on 40% FiO2 and 

BiPAP.  Patient is alert, oriented, does not seem to be in any form of 

distress.  Labs were reviewed including basic metabolic profile which is 

relatively normal, and CBC was normal.  Chest x-ray showed cardiomegaly and no 

evidence of any active disease.





Patient was reevaluated today on 2/19/2018, doing relatively well, on nasal 

cannula, in no distress.  Continues to have BiPAP at bedside, but apparently 

was not used today or last night.  It was used yesterday when she was noted to 

be confused admits.  Labs were reviewed basically unremarkable except for 

slightly elevated liver enzymes.  Renal profile is normal.





On 02/20/2018 patient seen again on medical surgical floor.  O2 is down to 3 L 

per nasal cannula, and did not wear her BiPAP mask last night.  She is awake, 

alert, denies any acute distress.  Lung sounds are active for scattered 

wheezes.  She remains afebrile, hemodynamically stable.  Her surgical pain is 

reasonably controlled.  She is compliant with her incentive spirometer, able to 

achieve 1500 on the today.  Abdominal incisions are clean dry and intact with 

approximated, AIDAN drain is present with moderate amount of serosanguineous 

drainage.  She is tolerating diet.  She has been up ambulating in the hallway, 

tolerated activity well.





On 02/21/2018 she seen in follow-up.  She is sitting up on the edge of the bed, 

she is doing very well.  Her IS effort is [phone] today.  She does qualify 

for home oxygen, she did desaturate to 84% on 2 L with ambulation.  Her home 

oxygen has already been arranged.  She denies any acute distress, lung sounds 

are clear to auscultation, no rhonchi, no wheezing or rales noted.  She will be 

started on the regular diet today, her abdominal incisions are clean dry and 

intact well approximated.  AIDAN drain on the right side of the abdomen remains 

with moderate amount of serosanguineous drainage.





On 02/22/2018 patient seen in follow-up.  Denies any acute distress, lung 

sounds are clear, no rhonchi, no wheezes or rales noted.  She remains on 2 L 

per nasal cannula with O2 sat at 91%.  She is compliant with her incentive 

spirometer, able to achieve [phone] on it today.  Tolerating regular diet, 

denies any pain.  Still has the AIDAN drain in the right side of the abdomen with 

small amount of serosanguineous output.  She has been up ambulating, tolerating 

activity well.  Able for discharge to the subacute rehab today from pulmonary 

standpoint.








Objective





- Vital Signs


Vital signs: 


 Vital Signs











Temp  97.7 F   02/22/18 07:00


 


Pulse  80   02/22/18 08:30


 


Resp  16   02/22/18 07:00


 


BP  117/66   02/22/18 07:00


 


Pulse Ox  91 L  02/22/18 08:21








 Intake & Output











 02/21/18 02/22/18 02/22/18





 18:59 06:59 18:59


 


Intake Total 960  


 


Output Total 110 270 


 


Balance 850 -270 


 


Intake:   


 


  Oral 960  


 


Output:   


 


  Drainage 110 70 


 


    Right Abdomen 110 70 


 


  Urine  200 


 


Other:   


 


  Voiding Method Bedside Commode  


 


  # Voids 1 3 














- Exam


Physical Exam: Revealed a 63-year-old female, obese, on nasal cannula, in no 

distress.


HEENT:[Neck is supple.] [No neck masses.] [No thyromegaly.] [No JVD.]  Moist 

mucous membranes.


Chest: Diminished breath sounds at the bases., no crackles, no rhonchi, no 

wheezes.]


Cardiac Exam: [Normal S1 and S2, no S3 gallop, no murmur.]


Abdomen: [Obese, postsurgical Soft, nontender,  no megaly, no rebound, no 

guarding, diminished bowel sounds.  Incisions clean dry and intact, well 

approximated.  AIDAN drainage present with moderate amount of serosanguineous 

drainage]


Extremities: [No clubbing, no edema, no cyanosis.]


Neurological Exam: [No gross focal neurologic deficit was noted when she was 

off sedation.


Psychiatric: Normal mood and affect, normal mental status examination noted.


Lymphatics: No lymphadenopathy was appreciated.








- Labs


CBC & Chem 7: 


 02/22/18 07:56





 02/22/18 07:56


Labs: 


 Abnormal Lab Results - Last 24 Hours (Table)











  02/21/18 02/21/18 02/21/18 Range/Units





  09:28 09:28 11:34 


 


WBC   19.1 H   (3.8-10.6)  k/uL


 


MCHC   30.2 L   (31.0-37.0)  g/dL


 


Neutrophils #   18.0 H   (1.3-7.7)  k/uL


 


Lymphocytes #   0.6 L   (1.0-4.8)  k/uL


 


Potassium     (3.5-5.1)  mmol/L


 


Chloride  91 L    ()  mmol/L


 


Carbon Dioxide  36 H    (22-30)  mmol/L


 


BUN  29 H    (7-17)  mg/dL


 


Glucose  237 H    (74-99)  mg/dL


 


POC Glucose (mg/dL)    186 H  (75-99)  mg/dL














  02/21/18 02/21/18 02/22/18 Range/Units





  17:05 20:38 07:15 


 


WBC     (3.8-10.6)  k/uL


 


MCHC     (31.0-37.0)  g/dL


 


Neutrophils #     (1.3-7.7)  k/uL


 


Lymphocytes #     (1.0-4.8)  k/uL


 


Potassium     (3.5-5.1)  mmol/L


 


Chloride     ()  mmol/L


 


Carbon Dioxide     (22-30)  mmol/L


 


BUN     (7-17)  mg/dL


 


Glucose     (74-99)  mg/dL


 


POC Glucose (mg/dL)  211 H  229 H  173 H  (75-99)  mg/dL














  02/22/18 02/22/18 Range/Units





  07:56 07:56 


 


WBC  18.1 H   (3.8-10.6)  k/uL


 


MCHC  30.9 L   (31.0-37.0)  g/dL


 


Neutrophils #  17.0 H   (1.3-7.7)  k/uL


 


Lymphocytes #  0.5 L   (1.0-4.8)  k/uL


 


Potassium   5.6 H  (3.5-5.1)  mmol/L


 


Chloride   93 L  ()  mmol/L


 


Carbon Dioxide   37 H  (22-30)  mmol/L


 


BUN   35 H  (7-17)  mg/dL


 


Glucose   139 H  (74-99)  mg/dL


 


POC Glucose (mg/dL)    (75-99)  mg/dL














Assessment and Plan


Plan: 


Assessment:





1 Postoperative hypoxic respiratory failure, unexpected, most likely related to 

bibasilar atelectasis, underlying COPD,  Patient was extubated on 2/17/2019, 

and tolerated the extubation well, however intermittently he may require BiPAP.





3 history of multiple comorbidities including obesity, benign essential 

hypertension, COPD, hypertriglyceridemia.





Recommendation: 





Patient remains stable from pulmonary standpoint, tolerating activity well, 

denies any acute distress.  She will go to the subacute rehab on O2 at 2 L per 

nasal cannula.  Continue encouraging the incentive spirometer.  We'll can 

discontinue the Solu-Medrol, she does not need a prednisone taper, her lung 

sounds are negative for any wheezing, rhonchi or any chest congestion.  

Antibiotics per ID service.  Patient is stable for discharge today.





I performed a history & physical examination of the patient and discussed their 

management with my nurse practitioner, Montserrat Mauricio.  I reviewed the nurse 

practitioner's note and agree with the documented findings and plan of care.  

Lung sounds are clear.  The findings and the impression was discussed with the 

patient.  I attest to the documentation by the nurse practitioner. 





Time with Patient: Less than 30

## 2018-02-22 NOTE — P.PN
Subjective


Progress Note Date: 02/22/18


Progress note being dictated for Dr. Dias.











Interval history: This is a 63-year-old female status post laparoscopic 

cholecystectomy for gangrenous cholecystitis, postoperative hypoxic respiratory 

failure and multiple other medical issues.  Passing flatus, no bowel movement.  

Tolerating diet, denies nausea vomiting.  Ambulating with walker in room.  

Incentive spirometer up to 1000.  Complains of fatigue.











2/22/18   incentive spirometer up to 1500, maintaining O2 sats of low 90s on 2 

L nasal cannula.  No bowel movement, passing flatus.  Pain controlled.  

Ambulating, tolerating exertion well.  Awaiting discharge to subacute rehab.  

Afebrile.  potassium 5.6.








Objective





- Vital Signs


Vital signs: 


 Vital Signs











Temp  97.7 F   02/22/18 07:00


 


Pulse  84   02/22/18 12:10


 


Resp  16   02/22/18 07:00


 


BP  117/66   02/22/18 07:00


 


Pulse Ox  91 L  02/22/18 08:21








 Intake & Output











 02/21/18 02/22/18 02/22/18





 18:59 06:59 18:59


 


Intake Total 960  1200


 


Output Total 110 270 40


 


Balance 850 -270 1160


 


Weight   133 kg


 


Intake:   


 


  Oral 960  1200


 


Output:   


 


  Drainage 110 70 40


 


    Right Abdomen 110 70 40


 


  Urine  200 


 


Other:   


 


  Voiding Method Bedside Commode  


 


  # Voids 1 3 3














- Exam





PHYSICAL EXAM:


VITAL SIGNS: [As above]


GENERAL: Sitting up in bed, no acute distress


HEENT:  Conjunctivae normal. eyes normal.  Oral mucosa moist


NECK:  No JVD. No thyroid enlargement. No LNs


CARDIOVASCULAR:  S1, S2 muffled. No murmur


RESPIRATION: Breath sounds diminished in the bases. No rhonchi or crackles.  No 

wheezing 


ABDOMEN:  Soft, status post surgery, AIDAN drain present with serosanguineous 

drainage.hypoactive Bowel sounds heard.


LEGS:  No edema. no swelling 


PSYCHIATRY: Alert and oriented -3, mood and affect normal.


NERVOUS SYSTEM:  Cranial N 2-12 grossly normal. Moves all 4 limbs.  Diffuse 

weakness No focal deficits.











- Labs


CBC & Chem 7: 


 02/22/18 07:56





 02/22/18 07:56


Labs: 


 Abnormal Lab Results - Last 24 Hours (Table)











  02/21/18 02/21/18 02/22/18 Range/Units





  17:05 20:38 07:15 


 


WBC     (3.8-10.6)  k/uL


 


MCHC     (31.0-37.0)  g/dL


 


Neutrophils #     (1.3-7.7)  k/uL


 


Lymphocytes #     (1.0-4.8)  k/uL


 


Potassium     (3.5-5.1)  mmol/L


 


Chloride     ()  mmol/L


 


Carbon Dioxide     (22-30)  mmol/L


 


BUN     (7-17)  mg/dL


 


Glucose     (74-99)  mg/dL


 


POC Glucose (mg/dL)  211 H  229 H  173 H  (75-99)  mg/dL














  02/22/18 02/22/18 02/22/18 Range/Units





  07:56 07:56 11:40 


 


WBC  18.1 H    (3.8-10.6)  k/uL


 


MCHC  30.9 L    (31.0-37.0)  g/dL


 


Neutrophils #  17.0 H    (1.3-7.7)  k/uL


 


Lymphocytes #  0.5 L    (1.0-4.8)  k/uL


 


Potassium   5.6 H   (3.5-5.1)  mmol/L


 


Chloride   93 L   ()  mmol/L


 


Carbon Dioxide   37 H   (22-30)  mmol/L


 


BUN   35 H   (7-17)  mg/dL


 


Glucose   139 H   (74-99)  mg/dL


 


POC Glucose (mg/dL)    160 H  (75-99)  mg/dL














Assessment and Plan


Assessment: 


1.  Status post laparoscopic cholecystectomy for gangrenous cholecystitis with 

AIDAN drain





2.  Acute hypoxic respiratory failure possibly secondary to underlying COPD 

acute exacerbation, status post extubation





3.  Postoperative acute hypoxemic respiratory failure





4.  Morbid obesity, BMI 45.9














Plan: Continue on current medication regime ,monitoring and symptomatic 

treatment.  Low potassium diet.  Repeat BMP in a.m. Maintain nebulized 

bronchodilators , antibiotics, aggressive pulmonary toileting.  Discharge 

planning in progress for today for subacute rehab as per surgery.  Follow-up 

with PCP 1 week after discharge from subacute rehab.  Further recommendations 

to follow.


























The impression and plan of care has been dictated as directed.





:


I performed a history and examination of this patient,  discussed the same with 

the dictator.  I agree with the dictator's note ,documented as a scribe.  Any 

additional findings or plans will be noted.

## 2018-02-22 NOTE — P.DS
Providers


Date of admission: 


02/13/18 20:02





Expected date of discharge: 02/22/18


Attending physician: 


Neil Trimble





Consults: 





 





02/13/18 19:55


Consult Physician Stat 


   Consulting Provider: Andre Peña


   Consult Reason/Comments: medical management


   Do you want consulting provider notified?: Yes, Notify in am





02/14/18 15:46


Consult Physician Routine 


   Consulting Provider: Avila Dimas


   Consult Reason/Comments: Sepsis, gangrenous cholecystitis


   Do you want consulting provider notified?: Yes





02/14/18 16:28


Consult Physician Routine 


   Consulting Provider: Harvey Davis


   Consult Reason/Comments: Medical management


   Do you want consulting provider notified?: Already Contacted





02/16/18 09:02


Consult Physician Routine 


   Consulting Provider: Jessica Ann


   Consult Reason/Comments: arrythmia, bradycardia


   Do you want consulting provider notified?: Yes











Primary care physician: 


[facility] Course: 





63-year-old female presented to [facility] emergency room on February 13 with a chief complaint of developing abdominal pain onset 1 week prior 

progressively worse along with nausea sensation couple episodes of vomiting 

with increased abdominal distention patient went to her primary care provider 

who advised the patient to present to the emergency room.   CAT scan of the 

abdomen pelvis with contrast showed a fatty infiltration of the liver acute on 

chronic cholecystitis and gallbladder calculi in the gallbladder neck possible 

impacted stone in the gallbladder neck.  Given the above clinical presentation 

patient was admitted to the services of the attending.  Patient underwent  

laproscopic cholecystectomy.  Findings showed a gangrenous gallbladder.  

Following the procedure the patient was extubated in the need to be reintubated 

and admitted to the intensive care unit.  Patient was monitored closely was 

able to be extubated on February 19 on BiPAP support titrated down to nasal 

cannula moved out of the ICU to a stepdown unit patient was followed by 

physical and occupational therapy and felt to be appropriate candidate for 

subacute rehab.  Patient was felt to be hemodynamically stable and the 22nd was 

transferred to [facility] facility








Impression discharge diagnosis





Present on admission sepsis febrile, leukocytosis, tachycardia likely due to 

gangrenous  gallbladder


Present on admission 3 day duration of right upper quadrant pain suspect due to 

an acute cholecystitis


CAT scan of the abdomen pelvis on admission showed evidence of cholelithiasis


Postop laparoscopic cholecystectomy due to an acute gangrenous cholecystitis 

done on February 14


Acute hypoxic respiratory failure unexpected  urgently reintubated 

postoperatively


Morbid obesity BMI 44


Echocardiogram done February 15 no evidence of shunt no pulmonary hypertension 

left ventricular systolic function normal EF between 55 and 60%


Status post ALFIE with bubble study done on February 16 no evidence of a shunt


Chronic diastolic heart failure as noted on echocardiogram


Leukocytosis persists likely due to steroid therapy





Postoperative hypoxic respiratory failure unexpected most likely related to by 

basal atelectasis underlying COPD extubated on February 17 tolerated however 

intermittently did require BiPAP








The above impression and plan of care have been discussed and directed by 

signing physician. Carmelina Damico nurse practitioner acting as scribe for signing 

physician.


Patient Condition at Discharge: Good





Plan - Discharge Summary


Discharge Rx Participant: Yes


New Discharge Prescriptions: 


New


   HYDROcodone/APAP 5-325MG [[place]co 5-325] 1 each PO Q6HR PRN #30 tab


     PRN Reason: Moderate To Severe Pain


   Ciprofloxacin HCl [Cipro] 500 mg PO Q12HR #14 tablet


   metroNIDAZOLE [Flagyl] 500 mg PO Q8HR #21 tab





Continue


   Cholecalciferol [Vitamin D3] 2,000 unit PO DAILY


   Tiotropium Bromide [Spiriva] 1 cap INHALATION RT-DAILY


   Fenofibrate [Lofibra] 160 mg PO DAILY


   Albuterol Inhaler [Ventolin Hfa Inhaler] 2 puff INHALATION RT-Q6H PRN


     PRN Reason: Shortness Of Breath


   Mometasone/Formoterol [Dulera 100 Mcg/5 Mcg Inhaler] 2 puff INHALATION RT-BID


   Furosemide [Lasix] 40 mg PO DAILY #40 tablet


   Pravastatin Sodium [Pravachol] 10 mg PO DAILY


   amLODIPine [Norvasc] 10 mg PO DAILY


   Albuterol Nebulized [Ventolin Nebulized] 2.5 mg INHALATION RT-Q6H PRN


     PRN Reason: Shortness Of Breath


Discharge Medication List





Albuterol Inhaler [Ventolin Hfa Inhaler] 2 puff INHALATION RT-Q6H PRN 06/14/16 [

History]


Cholecalciferol [Vitamin D3] 2,000 unit PO DAILY 06/14/16 [History]


Fenofibrate [Lofibra] 160 mg PO DAILY 06/14/16 [History]


Mometasone/Formoterol [Dulera 100 Mcg/5 Mcg Inhaler] 2 puff INHALATION RT-BID 06 /14/16 [History]


Tiotropium Bromide [Spiriva] 1 cap INHALATION RT-DAILY 06/14/16 [History]


Furosemide [Lasix] 40 mg PO DAILY #40 tablet 06/20/16 [Rx]


Pravastatin Sodium [Pravachol] 10 mg PO DAILY 07/10/17 [History]


Albuterol Nebulized [Ventolin Nebulized] 2.5 mg INHALATION RT-Q6H PRN 02/13/18 [

History]


amLODIPine [Norvasc] 10 mg PO DAILY 02/13/18 [History]


Ciprofloxacin HCl [Cipro] 500 mg PO Q12HR #14 tablet 02/22/18 [Rx]


HYDROcodone/APAP 5-325MG [[place] 5-325] 1 each PO Q6HR PRN #30 tab 02/22/18 [Rx]


metroNIDAZOLE [Flagyl] 500 mg PO Q8HR #21 tab 02/22/18 [Rx]








Follow up Appointment(s)/Referral(s): 


Nata Teague MD [STAFF PHYSICIAN] - 1 Week


Narendra Hill DO [Primary Care Provider] - 1-2 days


Neil Trimble MD [STAFF PHYSICIAN] - 1 Week


Activity/Diet/Wound Care/Special Instructions: 


No tub bath for six weeks.


Shower daily. 


No lifting over 4  pounds for the next 6 weeks.


Monitor AIDAN drain and record.


May use ice packs to surgical site.


No driving while taking narcotic for pain.


To use the incentive spirometer every 1 hour while awake


PT OT eval ambulate 


Discharge Disposition: TRANSFER TO SNF/ECF

## 2019-10-22 ENCOUNTER — HOSPITAL ENCOUNTER (OUTPATIENT)
Dept: HOSPITAL 47 - RADMAMWWP | Age: 64
Discharge: HOME | End: 2019-10-22
Attending: FAMILY MEDICINE
Payer: COMMERCIAL

## 2019-10-22 DIAGNOSIS — Z12.31: Primary | ICD-10-CM

## 2019-10-22 PROCEDURE — 77067 SCR MAMMO BI INCL CAD: CPT

## 2019-10-23 NOTE — MM
Reason for exam: screening  (asymptomatic).

Last mammogram was performed 2 years and 7 months ago.



History:

Patient is postmenopausal and is nulliparous.



Physical Findings:

A clinical breast exam by your physician is recommended on an annual basis and 

results should be correlated with mammographic findings.



MG Screening Mammo w CAD

Bilateral CC and MLO view(s) were taken.

Prior study comparison: March 27, 2017, left breast MG work up mamm w CAD LT.  

March 16, 2017, bilateral MG screening mammo w CAD.

The breast tissue is heterogeneously dense. This may lower the sensitivity of 

mammography.  There are benign appearing round dystrophic calcifications 

bilaterally. There is no discrete abnormality.





ASSESSMENT: Benign, BI-RAD 2



RECOMMENDATION:

Routine screening mammogram of both breasts in 1 year.

## 2020-10-23 ENCOUNTER — HOSPITAL ENCOUNTER (OUTPATIENT)
Dept: HOSPITAL 47 - RADMAMWWP | Age: 65
Discharge: HOME | End: 2020-10-23
Attending: FAMILY MEDICINE
Payer: COMMERCIAL

## 2020-10-23 DIAGNOSIS — Z12.31: Primary | ICD-10-CM

## 2020-10-23 PROCEDURE — 77067 SCR MAMMO BI INCL CAD: CPT

## 2020-10-27 NOTE — MM
Reason for exam: screening  (asymptomatic).

Last mammogram was performed 1 year ago.



History:

Patient is postmenopausal and is nulliparous.



Physical Findings:

A clinical breast exam by your physician is recommended on an annual basis and 

results should be correlated with mammographic findings.



MG Screening Mammo w CAD

Bilateral CC and MLO view(s) were taken.

Prior study comparison: October 22, 2019, bilateral MG screening mammo w CAD.  

March 27, 2017, left breast MG work up mamm w CAD LT.

There are scattered fibroglandular densities.  No significant changes when 

compared with prior studies.





ASSESSMENT: Negative, BI-RAD 1



RECOMMENDATION:

Routine screening mammogram of both breasts in 1 year.

## 2022-05-10 ENCOUNTER — HOSPITAL ENCOUNTER (OUTPATIENT)
Dept: HOSPITAL 47 - RADMAMWWP | Age: 67
Discharge: HOME | End: 2022-05-10
Attending: FAMILY MEDICINE
Payer: MEDICARE

## 2022-05-10 DIAGNOSIS — Z12.31: Primary | ICD-10-CM

## 2022-05-10 DIAGNOSIS — Z78.0: ICD-10-CM

## 2022-05-10 PROCEDURE — 77067 SCR MAMMO BI INCL CAD: CPT

## 2022-05-12 NOTE — MM
Reason for exam: screening  (asymptomatic).

Last mammogram was performed 1 year and 7 months ago.



History:

Patient is postmenopausal and is nulliparous.



Physical Findings:

A clinical breast exam by your physician is recommended on an annual basis and 

results should be correlated with mammographic findings.



MG Screening Mammo w CAD

Bilateral CC and MLO view(s) were taken.

Prior study comparison: October 23, 2020, bilateral MG screening mammo w CAD.  

October 22, 2019, bilateral MG screening mammo w CAD.

Benign calcifications.  No significant changes when compared with prior studies.





ASSESSMENT: Benign, BI-RAD 2



RECOMMENDATION:

Routine screening mammogram of both breasts in 1 year.

## 2023-06-07 ENCOUNTER — HOSPITAL ENCOUNTER (OUTPATIENT)
Dept: HOSPITAL 47 - RADMAMWWP | Age: 68
End: 2023-06-07
Attending: FAMILY MEDICINE
Payer: MEDICARE

## 2023-06-07 DIAGNOSIS — Z12.31: Primary | ICD-10-CM

## 2023-06-07 DIAGNOSIS — Z80.3: ICD-10-CM

## 2023-06-07 DIAGNOSIS — Z78.0: ICD-10-CM

## 2023-06-07 PROCEDURE — 77067 SCR MAMMO BI INCL CAD: CPT

## 2023-06-07 NOTE — MM
Reason for Exam: Screening  (asymptomatic). 

Last mammogram was performed 1 year(s) and 1 month(s) ago. 





Patient History: 

Menarche at age 15. Patient has no children. Postmenopausal.

Paternal cousin had breast cancer, age 68. 





Risk Values: 

Nyla 5 year model risk: 1.9%.

NCI Lifetime model risk: 6.7%.





Prior Study Comparison: 

10/22/2019 Bilateral Screening Mammogram, [facility]. 10/23/2020 Bilateral Screening Mammogram, [facility].

5/10/2022 Bilateral Screening Mammogram, [facility]. 





Tissue Density: 

The breast tissue is heterogeneously dense. This may lower the sensitivity of mammography.





Findings: 

Analyzed By CAD. 

There is no suspicious group of microcalcifications or new suspicious mass in either breast. Benign

appearing calcifications within both breasts. 





Overall Assessment: Benign, BI-RAD 2





Management: 

Screening Mammogram of both breasts in 1 year.

A clinical breast exam by your physician is recommended on an annual basis and results should be

correlated with mammographic findings.



Electronically signed and approved by: Marcelo Clements D.O.